# Patient Record
Sex: MALE | Race: WHITE | Employment: FULL TIME | URBAN - METROPOLITAN AREA
[De-identification: names, ages, dates, MRNs, and addresses within clinical notes are randomized per-mention and may not be internally consistent; named-entity substitution may affect disease eponyms.]

---

## 2020-01-10 ENCOUNTER — APPOINTMENT (OUTPATIENT)
Dept: GENERAL RADIOLOGY | Age: 40
End: 2020-01-10
Payer: MEDICAID

## 2020-01-10 ENCOUNTER — APPOINTMENT (OUTPATIENT)
Dept: ULTRASOUND IMAGING | Age: 40
End: 2020-01-10
Payer: MEDICAID

## 2020-01-10 ENCOUNTER — HOSPITAL ENCOUNTER (INPATIENT)
Age: 40
LOS: 5 days | Discharge: HOME | End: 2020-01-15
Attending: EMERGENCY MEDICINE | Admitting: INTERNAL MEDICINE
Payer: MEDICAID

## 2020-01-10 PROBLEM — R74.01 TRANSAMINITIS: Status: ACTIVE | Noted: 2020-01-10

## 2020-01-10 LAB
ABSOLUTE EOS #: 0.03 K/UL (ref 0–0.44)
ABSOLUTE IMMATURE GRANULOCYTE: <0.03 K/UL (ref 0–0.3)
ABSOLUTE LYMPH #: 1.2 K/UL (ref 1.1–3.7)
ABSOLUTE MONO #: 0.44 K/UL (ref 0.1–1.2)
ACETAMINOPHEN LEVEL: <5 UG/ML (ref 10–30)
ALBUMIN SERPL-MCNC: 4.7 G/DL (ref 3.5–5.2)
ALBUMIN/GLOBULIN RATIO: 1.3 (ref 1–2.5)
ALP BLD-CCNC: 82 U/L (ref 40–129)
ALT SERPL-CCNC: 989 U/L (ref 5–41)
ANION GAP SERPL CALCULATED.3IONS-SCNC: 18 MMOL/L (ref 9–17)
AST SERPL-CCNC: 650 U/L
BASOPHILS # BLD: 1 % (ref 0–2)
BASOPHILS ABSOLUTE: 0.04 K/UL (ref 0–0.2)
BILIRUB SERPL-MCNC: 0.2 MG/DL (ref 0.3–1.2)
BUN BLDV-MCNC: 16 MG/DL (ref 6–20)
BUN/CREAT BLD: ABNORMAL (ref 9–20)
CALCIUM SERPL-MCNC: 9.2 MG/DL (ref 8.6–10.4)
CHLORIDE BLD-SCNC: 95 MMOL/L (ref 98–107)
CO2: 22 MMOL/L (ref 20–31)
CREAT SERPL-MCNC: 0.88 MG/DL (ref 0.7–1.2)
DIFFERENTIAL TYPE: ABNORMAL
EOSINOPHILS RELATIVE PERCENT: 1 % (ref 1–4)
ETHANOL PERCENT: 0.16 %
ETHANOL: 161 MG/DL
GFR AFRICAN AMERICAN: >60 ML/MIN
GFR NON-AFRICAN AMERICAN: >60 ML/MIN
GFR SERPL CREATININE-BSD FRML MDRD: ABNORMAL ML/MIN/{1.73_M2}
GFR SERPL CREATININE-BSD FRML MDRD: ABNORMAL ML/MIN/{1.73_M2}
GLUCOSE BLD-MCNC: 144 MG/DL (ref 70–99)
HAV IGM SER IA-ACNC: NONREACTIVE
HCT VFR BLD CALC: 46.2 % (ref 40.7–50.3)
HEMOGLOBIN: 15.3 G/DL (ref 13–17)
HEPATITIS B CORE IGM ANTIBODY: NONREACTIVE
HEPATITIS B SURFACE ANTIGEN: NONREACTIVE
HEPATITIS C ANTIBODY: NONREACTIVE
IMMATURE GRANULOCYTES: 1 %
INR BLD: 0.9
LIPASE: 28 U/L (ref 13–60)
LYMPHOCYTES # BLD: 28 % (ref 24–43)
MCH RBC QN AUTO: 31.3 PG (ref 25.2–33.5)
MCHC RBC AUTO-ENTMCNC: 33.1 G/DL (ref 28.4–34.8)
MCV RBC AUTO: 94.5 FL (ref 82.6–102.9)
MONOCYTES # BLD: 10 % (ref 3–12)
NRBC AUTOMATED: 0 PER 100 WBC
PDW BLD-RTO: 13.3 % (ref 11.8–14.4)
PLATELET # BLD: 266 K/UL (ref 138–453)
PLATELET ESTIMATE: ABNORMAL
PMV BLD AUTO: 8.8 FL (ref 8.1–13.5)
POTASSIUM SERPL-SCNC: 4.1 MMOL/L (ref 3.7–5.3)
PROTHROMBIN TIME: 9.8 SEC (ref 9–12)
RBC # BLD: 4.89 M/UL (ref 4.21–5.77)
RBC # BLD: ABNORMAL 10*6/UL
SALICYLATE LEVEL: <1 MG/DL (ref 3–10)
SEG NEUTROPHILS: 59 % (ref 36–65)
SEGMENTED NEUTROPHILS ABSOLUTE COUNT: 2.62 K/UL (ref 1.5–8.1)
SODIUM BLD-SCNC: 135 MMOL/L (ref 135–144)
TOTAL PROTEIN: 8.3 G/DL (ref 6.4–8.3)
TOXIC TRICYCLIC SC,BLOOD: NEGATIVE
WBC # BLD: 4.4 K/UL (ref 3.5–11.3)
WBC # BLD: ABNORMAL 10*3/UL

## 2020-01-10 PROCEDURE — 1200000000 HC SEMI PRIVATE

## 2020-01-10 PROCEDURE — 99285 EMERGENCY DEPT VISIT HI MDM: CPT

## 2020-01-10 PROCEDURE — 76705 ECHO EXAM OF ABDOMEN: CPT

## 2020-01-10 PROCEDURE — 83690 ASSAY OF LIPASE: CPT

## 2020-01-10 PROCEDURE — 80307 DRUG TEST PRSMV CHEM ANLYZR: CPT

## 2020-01-10 PROCEDURE — 85025 COMPLETE CBC W/AUTO DIFF WBC: CPT

## 2020-01-10 PROCEDURE — 6370000000 HC RX 637 (ALT 250 FOR IP): Performed by: GENERAL PRACTICE

## 2020-01-10 PROCEDURE — 80053 COMPREHEN METABOLIC PANEL: CPT

## 2020-01-10 PROCEDURE — 80074 ACUTE HEPATITIS PANEL: CPT

## 2020-01-10 PROCEDURE — 85610 PROTHROMBIN TIME: CPT

## 2020-01-10 PROCEDURE — G0480 DRUG TEST DEF 1-7 CLASSES: HCPCS

## 2020-01-10 PROCEDURE — C9113 INJ PANTOPRAZOLE SODIUM, VIA: HCPCS | Performed by: STUDENT IN AN ORGANIZED HEALTH CARE EDUCATION/TRAINING PROGRAM

## 2020-01-10 PROCEDURE — 2580000003 HC RX 258: Performed by: STUDENT IN AN ORGANIZED HEALTH CARE EDUCATION/TRAINING PROGRAM

## 2020-01-10 PROCEDURE — 6360000002 HC RX W HCPCS: Performed by: STUDENT IN AN ORGANIZED HEALTH CARE EDUCATION/TRAINING PROGRAM

## 2020-01-10 PROCEDURE — 2580000003 HC RX 258: Performed by: GENERAL PRACTICE

## 2020-01-10 PROCEDURE — 71046 X-RAY EXAM CHEST 2 VIEWS: CPT

## 2020-01-10 RX ORDER — 0.9 % SODIUM CHLORIDE 0.9 %
500 INTRAVENOUS SOLUTION INTRAVENOUS ONCE
Status: COMPLETED | OUTPATIENT
Start: 2020-01-10 | End: 2020-01-10

## 2020-01-10 RX ORDER — ONDANSETRON 2 MG/ML
4 INJECTION INTRAMUSCULAR; INTRAVENOUS EVERY 6 HOURS PRN
Status: DISCONTINUED | OUTPATIENT
Start: 2020-01-10 | End: 2020-01-15 | Stop reason: HOSPADM

## 2020-01-10 RX ORDER — PANTOPRAZOLE SODIUM 40 MG/1
40 TABLET, DELAYED RELEASE ORAL
Status: DISCONTINUED | OUTPATIENT
Start: 2020-01-11 | End: 2020-01-10

## 2020-01-10 RX ORDER — THIAMINE MONONITRATE (VIT B1) 100 MG
100 TABLET ORAL DAILY
Status: DISCONTINUED | OUTPATIENT
Start: 2020-01-11 | End: 2020-01-15 | Stop reason: HOSPADM

## 2020-01-10 RX ORDER — SODIUM CHLORIDE 0.9 % (FLUSH) 0.9 %
10 SYRINGE (ML) INJECTION PRN
Status: DISCONTINUED | OUTPATIENT
Start: 2020-01-10 | End: 2020-01-15 | Stop reason: HOSPADM

## 2020-01-10 RX ORDER — SODIUM CHLORIDE 9 MG/ML
10 INJECTION INTRAVENOUS 2 TIMES DAILY
Status: DISCONTINUED | OUTPATIENT
Start: 2020-01-10 | End: 2020-01-12

## 2020-01-10 RX ORDER — PANTOPRAZOLE SODIUM 40 MG/10ML
40 INJECTION, POWDER, LYOPHILIZED, FOR SOLUTION INTRAVENOUS 2 TIMES DAILY
Status: DISCONTINUED | OUTPATIENT
Start: 2020-01-10 | End: 2020-01-12

## 2020-01-10 RX ORDER — PANTOPRAZOLE SODIUM 40 MG/1
40 TABLET, DELAYED RELEASE ORAL ONCE
Status: COMPLETED | OUTPATIENT
Start: 2020-01-10 | End: 2020-01-10

## 2020-01-10 RX ORDER — SODIUM CHLORIDE 0.9 % (FLUSH) 0.9 %
10 SYRINGE (ML) INJECTION EVERY 12 HOURS SCHEDULED
Status: DISCONTINUED | OUTPATIENT
Start: 2020-01-10 | End: 2020-01-15 | Stop reason: HOSPADM

## 2020-01-10 RX ORDER — M-VIT,TX,IRON,MINS/CALC/FOLIC 27MG-0.4MG
1 TABLET ORAL DAILY
Status: DISCONTINUED | OUTPATIENT
Start: 2020-01-11 | End: 2020-01-12

## 2020-01-10 RX ORDER — ONDANSETRON 4 MG/1
4 TABLET, ORALLY DISINTEGRATING ORAL ONCE
Status: COMPLETED | OUTPATIENT
Start: 2020-01-10 | End: 2020-01-10

## 2020-01-10 RX ORDER — IBUPROFEN 400 MG/1
400 TABLET ORAL ONCE
Status: COMPLETED | OUTPATIENT
Start: 2020-01-10 | End: 2020-01-10

## 2020-01-10 RX ADMIN — IBUPROFEN 400 MG: 400 TABLET, FILM COATED ORAL at 17:46

## 2020-01-10 RX ADMIN — PANTOPRAZOLE SODIUM 40 MG: 40 INJECTION, POWDER, FOR SOLUTION INTRAVENOUS at 21:38

## 2020-01-10 RX ADMIN — Medication 10 ML: at 21:38

## 2020-01-10 RX ADMIN — PANTOPRAZOLE SODIUM 40 MG: 40 TABLET, DELAYED RELEASE ORAL at 13:37

## 2020-01-10 RX ADMIN — ONDANSETRON 4 MG: 4 TABLET, ORALLY DISINTEGRATING ORAL at 13:37

## 2020-01-10 RX ADMIN — SODIUM CHLORIDE 500 ML: 9 INJECTION, SOLUTION INTRAVENOUS at 17:58

## 2020-01-10 ASSESSMENT — PAIN SCALES - GENERAL
PAINLEVEL_OUTOF10: 0
PAINLEVEL_OUTOF10: 8
PAINLEVEL_OUTOF10: 7
PAINLEVEL_OUTOF10: 5

## 2020-01-10 ASSESSMENT — ENCOUNTER SYMPTOMS
ABDOMINAL PAIN: 0
NAUSEA: 1
SINUS PRESSURE: 0
BLOOD IN STOOL: 0
COUGH: 1
SINUS PAIN: 0
VOMITING: 1

## 2020-01-10 ASSESSMENT — PAIN DESCRIPTION - DESCRIPTORS: DESCRIPTORS: THROBBING

## 2020-01-10 ASSESSMENT — PAIN DESCRIPTION - PAIN TYPE: TYPE: ACUTE PAIN

## 2020-01-10 ASSESSMENT — PAIN DESCRIPTION - PROGRESSION
CLINICAL_PROGRESSION: NOT CHANGED
CLINICAL_PROGRESSION: GRADUALLY IMPROVING

## 2020-01-10 ASSESSMENT — PAIN DESCRIPTION - LOCATION
LOCATION: HEAD
LOCATION: HEAD

## 2020-01-10 ASSESSMENT — PAIN DESCRIPTION - FREQUENCY: FREQUENCY: CONTINUOUS

## 2020-01-10 NOTE — ED NOTES
Pt resting on bed, c/o pain in the middle of the RUQ and LUQ, febrile, no acute distress     Lev Watson RN  01/10/20 9211

## 2020-01-10 NOTE — ED PROVIDER NOTES
[facility]  ED  Emergency Department Encounter  EmergencyMedicine Resident     Pt Name:Rolly Dudley  MRN: [id number]  [place] 1980  Date of evaluation: 1/10/20  PCP:  No primary care provider on file. CHIEF COMPLAINT       Chief Complaint   Patient presents with    Hemoptysis       HISTORY OF PRESENT ILLNESS  (Location/Symptom, Timing/Onset, Context/Setting, Quality, Duration, Modifying Factors, Severity.)      Jacobo Preciado is a [address] [facility] y.o. male who presents with hemoptysis. Patient states he has had fever, chills, body aches, nausea, vomiting and pain in his chest with coughing and breathing. Patient states he is a chronic alcoholic, takes naltrexone, but states he is still drinking, approximately a gallon of 80 proof vodka a day, drink yesterday, denies any vomiting with drinking. Patient states he has been coughing up bright red blood with pleuritic chest pain. Patient denies any abdominal pain. denies skin rash, any recent travel out of the country. PAST MEDICAL / SURGICAL / SOCIAL / FAMILY HISTORY      has no past medical history on file. has no past surgical history on file.     Social History     Socioeconomic History    Marital status:      Spouse name: Not on file    Number of children: Not on file    Years of education: Not on file    Highest education level: Not on file   Occupational History    Not on file   Social Needs    Financial resource strain: Not on file    Food insecurity:     Worry: Not on file     Inability: Not on file    Transportation needs:     Medical: Not on file     Non-medical: Not on file   Tobacco Use    Smoking status: Not on file   Substance and Sexual Activity    Alcohol use: Not on file    Drug use: Not on file    Sexual activity: Not on file   Lifestyle    Physical activity:     Days per week: Not on file     Minutes per session: Not on file    Stress: Not on file   Relationships    Social connections:     Talks external ear normal.      Left Ear: Tympanic membrane, ear canal and external ear normal.      Nose: Nose normal.      Mouth/Throat:      Mouth: Mucous membranes are moist.      Pharynx: No oropharyngeal exudate or posterior oropharyngeal erythema. Eyes:      General:         Right eye: No discharge. Left eye: No discharge. Extraocular Movements: Extraocular movements intact. Pupils: Pupils are equal, round, and reactive to light. Cardiovascular:      Rate and Rhythm: Normal rate. Pulses: Normal pulses. Heart sounds: Normal heart sounds. Pulmonary:      Effort: Pulmonary effort is normal.      Breath sounds: Normal breath sounds. Abdominal:      General: Abdomen is flat. Tenderness: There is no tenderness. There is no guarding. Musculoskeletal: Normal range of motion. Skin:     General: Skin is warm. Neurological:      General: No focal deficit present. Mental Status: He is alert and oriented to person, place, and time. Motor: No weakness. Psychiatric:         Mood and Affect: Mood normal.         Behavior: Behavior normal.         Thought Content:  Thought content normal.         Judgment: Judgment normal.         DIFFERENTIAL  DIAGNOSIS     PLAN (LABS / IMAGING / EKG):  Orders Placed This Encounter   Procedures    XR CHEST STANDARD (2 VW)    CBC Auto Differential    Comprehensive Metabolic Panel    LIPASE    Hepatitis Panel, Acute    Protime-INR    TOX SCR, BLD, ED    Inpatient consult to GI    Inpatient consult to Internal Medicine    PATIENT STATUS (FROM ED OR OR/PROCEDURAL) Inpatient       MEDICATIONS ORDERED:  Orders Placed This Encounter   Medications    ondansetron (ZOFRAN-ODT) disintegrating tablet 4 mg    DISCONTD: pantoprazole (PROTONIX) tablet 40 mg    pantoprazole (PROTONIX) tablet 40 mg       DDX: Pneumonia, aspiration pneumonitis, lung abscess, gastric varices, Cristal-Mitchell, less likely Boerhaave's or tuberculosis    DIAGNOSTIC RESULTS / EMERGENCY DEPARTMENT COURSE / MDM   :  Results for orders placed or performed during the hospital encounter of 01/10/20   CBC Auto Differential   Result Value Ref Range    WBC 4.4 3.5 - 11.3 k/uL    RBC 4.89 4.21 - 5.77 m/uL    Hemoglobin 15.3 13.0 - 17.0 g/dL    Hematocrit 46.2 40.7 - 50.3 %    MCV 94.5 82.6 - 102.9 fL    MCH 31.3 25.2 - 33.5 pg    MCHC 33.1 28.4 - 34.8 g/dL    RDW 13.3 11.8 - 14.4 %    Platelets 883 596 - 152 k/uL    MPV 8.8 8.1 - 13.5 fL    NRBC Automated 0.0 0.0 per 100 WBC    Differential Type NOT REPORTED     Seg Neutrophils 59 36 - 65 %    Lymphocytes 28 24 - 43 %    Monocytes 10 3 - 12 %    Eosinophils % 1 1 - 4 %    Basophils 1 0 - 2 %    Immature Granulocytes 1 (H) 0 %    Segs Absolute 2.62 1.50 - 8.10 k/uL    Absolute Lymph # 1.20 1.10 - 3.70 k/uL    Absolute Mono # 0.44 0.10 - 1.20 k/uL    Absolute Eos # 0.03 0.00 - 0.44 k/uL    Basophils Absolute 0.04 0.00 - 0.20 k/uL    Absolute Immature Granulocyte <0.03 0.00 - 0.30 k/uL    WBC Morphology NOT REPORTED     RBC Morphology NOT REPORTED     Platelet Estimate NOT REPORTED    Comprehensive Metabolic Panel   Result Value Ref Range    Glucose 144 (H) 70 - 99 mg/dL    BUN 16 6 - 20 mg/dL    CREATININE 0.88 0.70 - 1.20 mg/dL    Bun/Cre Ratio NOT REPORTED 9 - 20    Calcium 9.2 8.6 - 10.4 mg/dL    Sodium 135 135 - 144 mmol/L    Potassium 4.1 3.7 - 5.3 mmol/L    Chloride 95 (L) 98 - 107 mmol/L    CO2 22 20 - 31 mmol/L    Anion Gap 18 (H) 9 - 17 mmol/L    Alkaline Phosphatase 82 40 - 129 U/L     (H) 5 - 41 U/L     (H) <40 U/L    Total Bilirubin 0.20 (L) 0.3 - 1.2 mg/dL    Total Protein 8.3 6.4 - 8.3 g/dL    Alb 4.7 3.5 - 5.2 g/dL    Albumin/Globulin Ratio 1.3 1.0 - 2.5    GFR Non-African American >60 >60 mL/min    GFR African American >60 >60 mL/min    GFR Comment          GFR Staging NOT REPORTED    LIPASE   Result Value Ref Range    Lipase 28 13 - 60 U/L   Protime-INR   Result Value Ref Range    Protime 9.8 9.0 - 12.0 sec

## 2020-01-10 NOTE — ED NOTES
Patient instructed that he is unable to eat at this time.  Ultrasound done per ER resident      Mariam Pressley, RN  01/10/20 0512

## 2020-01-10 NOTE — ED NOTES
Pt ambulatory to ER c/o fever, hemoptysis, nausea, emesis, fatigue x 3 days.  Pt not experiencing any chest pain, resting on bed, no acute distress, alert and oriented x4          Minus SHELLEY Lai  01/10/20 0979

## 2020-01-11 PROBLEM — K70.10 ALCOHOLIC HEPATITIS WITHOUT ASCITES: Status: ACTIVE | Noted: 2020-01-11

## 2020-01-11 PROBLEM — R04.2 COUGH WITH HEMOPTYSIS: Status: ACTIVE | Noted: 2020-01-11

## 2020-01-11 LAB
ABSOLUTE EOS #: 0.04 K/UL (ref 0–0.44)
ABSOLUTE IMMATURE GRANULOCYTE: <0.03 K/UL (ref 0–0.3)
ABSOLUTE LYMPH #: 0.96 K/UL (ref 1.1–3.7)
ABSOLUTE MONO #: 0.44 K/UL (ref 0.1–1.2)
ADENOVIRUS PCR: NOT DETECTED
ALBUMIN SERPL-MCNC: 4.2 G/DL (ref 3.5–5.2)
ALBUMIN/GLOBULIN RATIO: 1.4 (ref 1–2.5)
ALP BLD-CCNC: 71 U/L (ref 40–129)
ALT SERPL-CCNC: 746 U/L (ref 5–41)
ANION GAP SERPL CALCULATED.3IONS-SCNC: 19 MMOL/L (ref 9–17)
AST SERPL-CCNC: 431 U/L
BASOPHILS # BLD: 1 % (ref 0–2)
BASOPHILS ABSOLUTE: 0.04 K/UL (ref 0–0.2)
BILIRUB SERPL-MCNC: 0.32 MG/DL (ref 0.3–1.2)
BILIRUBIN DIRECT: 0.09 MG/DL
BILIRUBIN, INDIRECT: 0.23 MG/DL (ref 0–1)
BORDETELLA PARAPERTUSSIS: NOT DETECTED
BORDETELLA PERTUSSIS PCR: NOT DETECTED
BUN BLDV-MCNC: 16 MG/DL (ref 6–20)
BUN/CREAT BLD: ABNORMAL (ref 9–20)
CALCIUM SERPL-MCNC: 8.9 MG/DL (ref 8.6–10.4)
CHLAMYDIA PNEUMONIAE BY PCR: NOT DETECTED
CHLORIDE BLD-SCNC: 92 MMOL/L (ref 98–107)
CO2: 20 MMOL/L (ref 20–31)
CORONAVIRUS 229E PCR: NOT DETECTED
CORONAVIRUS HKU1 PCR: NOT DETECTED
CORONAVIRUS NL63 PCR: NOT DETECTED
CORONAVIRUS OC43 PCR: NOT DETECTED
CREAT SERPL-MCNC: 0.84 MG/DL (ref 0.7–1.2)
DIFFERENTIAL TYPE: ABNORMAL
EOSINOPHILS RELATIVE PERCENT: 1 % (ref 1–4)
FERRITIN: 2450 UG/L (ref 30–400)
FOLATE: 15 NG/ML
GFR AFRICAN AMERICAN: >60 ML/MIN
GFR NON-AFRICAN AMERICAN: >60 ML/MIN
GFR SERPL CREATININE-BSD FRML MDRD: ABNORMAL ML/MIN/{1.73_M2}
GFR SERPL CREATININE-BSD FRML MDRD: ABNORMAL ML/MIN/{1.73_M2}
GLOBULIN: ABNORMAL G/DL (ref 1.5–3.8)
GLUCOSE BLD-MCNC: 95 MG/DL (ref 70–99)
HCT VFR BLD CALC: 43.9 % (ref 40.7–50.3)
HEMOGLOBIN: 14.4 G/DL (ref 13–17)
HUMAN METAPNEUMOVIRUS PCR: NOT DETECTED
IMMATURE GRANULOCYTES: 0 %
INFLUENZA A BY PCR: NOT DETECTED
INFLUENZA A H1 (2009) PCR: ABNORMAL
INFLUENZA A H1 PCR: ABNORMAL
INFLUENZA A H3 PCR: ABNORMAL
INFLUENZA B BY PCR: DETECTED
LYMPHOCYTES # BLD: 31 % (ref 24–43)
MCH RBC QN AUTO: 31.4 PG (ref 25.2–33.5)
MCHC RBC AUTO-ENTMCNC: 32.8 G/DL (ref 28.4–34.8)
MCV RBC AUTO: 95.6 FL (ref 82.6–102.9)
MONOCYTES # BLD: 14 % (ref 3–12)
MYCOPLASMA PNEUMONIAE PCR: NOT DETECTED
NRBC AUTOMATED: 0 PER 100 WBC
PARAINFLUENZA 1 PCR: NOT DETECTED
PARAINFLUENZA 2 PCR: NOT DETECTED
PARAINFLUENZA 3 PCR: NOT DETECTED
PARAINFLUENZA 4 PCR: NOT DETECTED
PDW BLD-RTO: 13 % (ref 11.8–14.4)
PLATELET # BLD: 217 K/UL (ref 138–453)
PLATELET ESTIMATE: ABNORMAL
PMV BLD AUTO: 8.8 FL (ref 8.1–13.5)
POTASSIUM SERPL-SCNC: 4.2 MMOL/L (ref 3.7–5.3)
RBC # BLD: 4.59 M/UL (ref 4.21–5.77)
RBC # BLD: ABNORMAL 10*6/UL
RESP SYNCYTIAL VIRUS PCR: NOT DETECTED
RHINO/ENTEROVIRUS PCR: NOT DETECTED
SEG NEUTROPHILS: 53 % (ref 36–65)
SEGMENTED NEUTROPHILS ABSOLUTE COUNT: 1.62 K/UL (ref 1.5–8.1)
SODIUM BLD-SCNC: 131 MMOL/L (ref 135–144)
SPECIMEN DESCRIPTION: ABNORMAL
TOTAL PROTEIN: 7.3 G/DL (ref 6.4–8.3)
VITAMIN B-12: 1179 PG/ML (ref 232–1245)
WBC # BLD: 3.1 K/UL (ref 3.5–11.3)
WBC # BLD: ABNORMAL 10*3/UL

## 2020-01-11 PROCEDURE — 86694 HERPES SIMPLEX NES ANTBDY: CPT

## 2020-01-11 PROCEDURE — 2580000003 HC RX 258: Performed by: STUDENT IN AN ORGANIZED HEALTH CARE EDUCATION/TRAINING PROGRAM

## 2020-01-11 PROCEDURE — 1200000000 HC SEMI PRIVATE

## 2020-01-11 PROCEDURE — 86645 CMV ANTIBODY IGM: CPT

## 2020-01-11 PROCEDURE — 86255 FLUORESCENT ANTIBODY SCREEN: CPT

## 2020-01-11 PROCEDURE — 80048 BASIC METABOLIC PNL TOTAL CA: CPT

## 2020-01-11 PROCEDURE — 99222 1ST HOSP IP/OBS MODERATE 55: CPT | Performed by: INTERNAL MEDICINE

## 2020-01-11 PROCEDURE — 82746 ASSAY OF FOLIC ACID SERUM: CPT

## 2020-01-11 PROCEDURE — 85025 COMPLETE CBC W/AUTO DIFF WBC: CPT

## 2020-01-11 PROCEDURE — 82728 ASSAY OF FERRITIN: CPT

## 2020-01-11 PROCEDURE — 86664 EPSTEIN-BARR NUCLEAR ANTIGEN: CPT

## 2020-01-11 PROCEDURE — 36415 COLL VENOUS BLD VENIPUNCTURE: CPT

## 2020-01-11 PROCEDURE — 86235 NUCLEAR ANTIGEN ANTIBODY: CPT

## 2020-01-11 PROCEDURE — 6370000000 HC RX 637 (ALT 250 FOR IP): Performed by: STUDENT IN AN ORGANIZED HEALTH CARE EDUCATION/TRAINING PROGRAM

## 2020-01-11 PROCEDURE — 86225 DNA ANTIBODY NATIVE: CPT

## 2020-01-11 PROCEDURE — 82607 VITAMIN B-12: CPT

## 2020-01-11 PROCEDURE — 6360000002 HC RX W HCPCS: Performed by: STUDENT IN AN ORGANIZED HEALTH CARE EDUCATION/TRAINING PROGRAM

## 2020-01-11 PROCEDURE — 0100U HC RESPIRPTHGN MULT REV TRANS & AMP PRB TECH 21 TRGT: CPT

## 2020-01-11 PROCEDURE — 80076 HEPATIC FUNCTION PANEL: CPT

## 2020-01-11 PROCEDURE — C9113 INJ PANTOPRAZOLE SODIUM, VIA: HCPCS | Performed by: STUDENT IN AN ORGANIZED HEALTH CARE EDUCATION/TRAINING PROGRAM

## 2020-01-11 PROCEDURE — 86665 EPSTEIN-BARR CAPSID VCA: CPT

## 2020-01-11 PROCEDURE — 86663 EPSTEIN-BARR ANTIBODY: CPT

## 2020-01-11 PROCEDURE — 86038 ANTINUCLEAR ANTIBODIES: CPT

## 2020-01-11 RX ORDER — LORAZEPAM 2 MG/ML
3 INJECTION INTRAMUSCULAR
Status: DISCONTINUED | OUTPATIENT
Start: 2020-01-11 | End: 2020-01-15 | Stop reason: HOSPADM

## 2020-01-11 RX ORDER — FOLIC ACID 1 MG/1
1 TABLET ORAL DAILY
Status: DISCONTINUED | OUTPATIENT
Start: 2020-01-11 | End: 2020-01-15 | Stop reason: HOSPADM

## 2020-01-11 RX ORDER — TRAMADOL HYDROCHLORIDE 50 MG/1
50 TABLET ORAL EVERY 8 HOURS PRN
Status: DISCONTINUED | OUTPATIENT
Start: 2020-01-11 | End: 2020-01-15 | Stop reason: HOSPADM

## 2020-01-11 RX ORDER — LORAZEPAM 1 MG/1
1 TABLET ORAL
Status: DISCONTINUED | OUTPATIENT
Start: 2020-01-11 | End: 2020-01-15 | Stop reason: HOSPADM

## 2020-01-11 RX ORDER — LORAZEPAM 2 MG/1
2 TABLET ORAL
Status: DISCONTINUED | OUTPATIENT
Start: 2020-01-11 | End: 2020-01-15 | Stop reason: HOSPADM

## 2020-01-11 RX ORDER — OSELTAMIVIR PHOSPHATE 75 MG/1
75 CAPSULE ORAL 2 TIMES DAILY
Status: DISCONTINUED | OUTPATIENT
Start: 2020-01-11 | End: 2020-01-15 | Stop reason: HOSPADM

## 2020-01-11 RX ORDER — LORAZEPAM 0.5 MG/1
0.5 TABLET ORAL ONCE
Status: COMPLETED | OUTPATIENT
Start: 2020-01-11 | End: 2020-01-11

## 2020-01-11 RX ORDER — LORAZEPAM 2 MG/1
4 TABLET ORAL
Status: DISCONTINUED | OUTPATIENT
Start: 2020-01-11 | End: 2020-01-15 | Stop reason: HOSPADM

## 2020-01-11 RX ORDER — LORAZEPAM 2 MG/ML
2 INJECTION INTRAMUSCULAR
Status: DISCONTINUED | OUTPATIENT
Start: 2020-01-11 | End: 2020-01-15 | Stop reason: HOSPADM

## 2020-01-11 RX ORDER — LORAZEPAM 2 MG/ML
1 INJECTION INTRAMUSCULAR
Status: DISCONTINUED | OUTPATIENT
Start: 2020-01-11 | End: 2020-01-15 | Stop reason: HOSPADM

## 2020-01-11 RX ORDER — LORAZEPAM 2 MG/ML
4 INJECTION INTRAMUSCULAR
Status: DISCONTINUED | OUTPATIENT
Start: 2020-01-11 | End: 2020-01-15 | Stop reason: HOSPADM

## 2020-01-11 RX ADMIN — OSELTAMIVIR PHOSPHATE 75 MG: 75 CAPSULE ORAL at 20:12

## 2020-01-11 RX ADMIN — Medication 10 ML: at 09:37

## 2020-01-11 RX ADMIN — PANTOPRAZOLE SODIUM 40 MG: 40 INJECTION, POWDER, FOR SOLUTION INTRAVENOUS at 09:37

## 2020-01-11 RX ADMIN — Medication 10 ML: at 20:13

## 2020-01-11 RX ADMIN — OSELTAMIVIR PHOSPHATE 75 MG: 75 CAPSULE ORAL at 09:37

## 2020-01-11 RX ADMIN — TRAMADOL HYDROCHLORIDE 50 MG: 50 TABLET, FILM COATED ORAL at 14:31

## 2020-01-11 RX ADMIN — LORAZEPAM 0.5 MG: 0.5 TABLET ORAL at 03:49

## 2020-01-11 RX ADMIN — ENOXAPARIN SODIUM 40 MG: 40 INJECTION SUBCUTANEOUS at 09:37

## 2020-01-11 RX ADMIN — MULTIPLE VITAMINS W/ MINERALS TAB 1 TABLET: TAB at 14:27

## 2020-01-11 RX ADMIN — PANTOPRAZOLE SODIUM 40 MG: 40 INJECTION, POWDER, FOR SOLUTION INTRAVENOUS at 20:12

## 2020-01-11 RX ADMIN — Medication 100 MG: at 09:37

## 2020-01-11 RX ADMIN — FOLIC ACID 1 MG: 1 TABLET ORAL at 09:37

## 2020-01-11 ASSESSMENT — ENCOUNTER SYMPTOMS
WHEEZING: 0
NAUSEA: 1
ABDOMINAL PAIN: 0
COUGH: 1
SHORTNESS OF BREATH: 0
EYE DISCHARGE: 0
BACK PAIN: 0
EYE ITCHING: 0
FACIAL SWELLING: 0
COLOR CHANGE: 0
ABDOMINAL DISTENTION: 0
VOMITING: 0
BLOOD IN STOOL: 0
CHEST TIGHTNESS: 0
CONSTIPATION: 0
APNEA: 0
EYE REDNESS: 0

## 2020-01-11 ASSESSMENT — PAIN SCALES - GENERAL: PAINLEVEL_OUTOF10: 6

## 2020-01-11 NOTE — PROGRESS NOTES
Alcohol hepatitis with strong alcohol intake history. Elevated liver enzymes but ALT > AST, trend LFTs. PT- INR, low fat diet. hepatitis panel. Get USG liver, autoimmune hepatitis work up. Check ferritin if high ( unlikely) will consider working up for hemochromatosis. No tylenol intake. Check other viral illness EBV, CMV, HSV. Will check ED tox. Highly unlikley nata disease no other systemic involvement, no Avila - Fleischer rings. Hemoptysis. Had clear discussion with the pt whether this is hemoptysis or spitting up of blood from GI origin. Pt stating he coughs up sputum with most of the time streaks of blood b. No clear etiology identified at this time. Could be simple bronchitis secondary to smoking or viral bronchitis. Will get resp viral PCR panel. CXR looks clean no Pneumonia. Will resume DVT prophylaxis.

## 2020-01-11 NOTE — H&P
[address]     Department of Internal Medicine - Staff Internal Medicine Teaching Service          ADMISSION NOTE/HISTORY AND PHYSICAL EXAMINATION   Date: 1/11/2020  Patient Name: Lamin Noriega  Date of admission: 1/10/2020  1:21 PM  YOB: 1980  PCP: No primary care provider on file. History Obtained From:  patient    CHIEF COMPLAINT     Chief complaint: Hemoptysis    HISTORY OF PRESENTING ILLNESS     The patient is a pleasant 44 y.o. male presents with a chief complaint of hemoptysis. Patient states that sometimes when he coughs he brings up yellowish sputum stained with blood. He says that for the past couple of days he has been cough with sputum production. He also complains of having fever, chills, body aches, nausea and vomiting with mostly food particles but no blood. He says that there are people sick around him. He denies having any diarrhea or constipation. He denies any palpitations or shortness of breath. He says that he has been drinking alcohol everyday for a long time. He went to [facility] rehab last year but then checked himself out. He is still taking Naltrexone but drinks around a gallon of 80% proof vodka everyday. He says that he hasn't had episodes of vomiting after drinking and says that he has never aspirated. He denied taking any over the counter supplements, said he took just one tylenol in the past couple of days and also denies taking any workout supplements. Patient does not have any other significant medical history, with one admission to [facility] clinic for a laceration when he fell after drinking. He smokes about half a pack of cigarettes a day for the past 10 yrs. He also says that he smokes marijuana once in a while but denied any other drugs. In the emergency department ALT and AST found to be elevated. Ethanol levels were elevated. Hepatitis panel was added on.   INR was 0.9, bedside ultrasound was performed by ultrasound resident, there was some echogenicity in the pancreas, with a small amount of peripancreatic fluid noted, patient did not have any belly pain, lipase was 28. Discussed with GI, who recommended admission for further work-up. Review of Systems:  Review of Systems   Constitutional: Negative for activity change, appetite change, fatigue and fever. HENT: Negative for congestion, drooling, ear discharge, facial swelling, mouth sores and postnasal drip. Eyes: Negative for discharge, redness and itching. Respiratory: Positive for cough. Negative for apnea, chest tightness, shortness of breath and wheezing. Cardiovascular: Positive for chest pain (mostly with coughing). Gastrointestinal: Positive for nausea. Negative for abdominal distention, abdominal pain, blood in stool, constipation and vomiting. Endocrine: Negative for cold intolerance and heat intolerance. Genitourinary: Negative for difficulty urinating, dysuria, enuresis and frequency. Musculoskeletal: Negative for arthralgias and back pain. Skin: Negative for color change, pallor and wound. Allergic/Immunologic: Negative for environmental allergies and food allergies. Neurological: Negative for dizziness, facial asymmetry, speech difficulty and light-headedness. Hematological: Negative for adenopathy. Psychiatric/Behavioral: Negative for agitation, behavioral problems, confusion and decreased concentration. PAST MEDICAL HISTORY     History reviewed. No pertinent past medical history. PAST SURGICAL HISTORY     No past surgical history on file. ALLERGIES     Patient has no known allergies.     MEDICATIONS PRIOR TO ADMISSION     Prior to Admission medications    Not on File       SOCIAL HISTORY     Social History     Socioeconomic History    Marital status:      Spouse name: Not on file    Number of children: Not on file    Years of education: Not on file    Highest education level: Not on file Occupational History    Not on file   Social Needs    Financial resource strain: Not on file    Food insecurity:     Worry: Not on file     Inability: Not on file    Transportation needs:     Medical: Not on file     Non-medical: Not on file   Tobacco Use    Smoking status: Not on file   Substance and Sexual Activity    Alcohol use: Not on file    Drug use: Not on file    Sexual activity: Not on file   Lifestyle    Physical activity:     Days per week: Not on file     Minutes per session: Not on file    Stress: Not on file   Relationships    Social connections:     Talks on phone: Not on file     Gets together: Not on file     Attends Zoroastrian service: Not on file     Active member of club or organization: Not on file     Attends meetings of clubs or organizations: Not on file     Relationship status: Not on file    Intimate partner violence:     Fear of current or ex partner: Not on file     Emotionally abused: Not on file     Physically abused: Not on file     Forced sexual activity: Not on file   Other Topics Concern    Not on file   Social History Narrative    Not on file         FAMILY HISTORY     No family history on file. PHYSICAL EXAM     Vitals: BP (!) 131/95   Pulse 69   Temp 98.4 °F (36.9 °C) (Oral)   Resp 19   Ht 5' 5\" (1.651 m)   Wt 161 lb (73 kg)   SpO2 96%   BMI 26.79 kg/m²   Tmax: Temp (24hrs), Av.5 °F (37.5 °C), Min:98.4 °F (36.9 °C), Max:102.2 °F (39 °C)    Last Body weight:   Wt Readings from Last 3 Encounters:   01/10/20 161 lb (73 kg)     Body Mass Index : Body mass index is 26.79 kg/m². PHYSICAL EXAMINATION:  Physical Exam  Constitutional:       Appearance: Normal appearance. HENT:      Head: Normocephalic and atraumatic. Mouth/Throat:      Mouth: Mucous membranes are moist.      Pharynx: Oropharynx is clear. Eyes:      Extraocular Movements: Extraocular movements intact. Pupils: Pupils are equal, round, and reactive to light.    Neck: with alcohol intoxication and found to have transaminitis. Patient admitted to inpatient status for further workup    Active Problems:  · Transaminitis         -Chronic alcoholism         -,          -Viral hepatitis panel negative         -Will follow up with results of autoimmune hepatitis         -Follow up vitamin B12 and folate levels    · Alcohol abuse         -Patient takes Naltrexone but still drinking         -will start CIWA protocol in the AM    · Tobacco abuse         -counseling on cessation         -7mg Nicotine patch      DVT ppx: None  GI ppx: Protonix    PT/OT/SW Onboard  Discharge Planning: as per     Katie Rogers MD  Internal Medicine Resident, PGY-1  [address], [place]  1/11/2020, 1:03 AM

## 2020-01-11 NOTE — PROGRESS NOTES
Physical Therapy  DATE: 2020    NAME: Lamin Noriega  MRN: [id number]   : 1980    Patient not seen this date for Physical Therapy due to:  [] Blood transfusion in progress  [] Hemodialysis  []  Patient Declined  [] Spine Precautions   [] Strict Bedrest  [] Surgery/ Procedure  [] Testing      [x] Other: Pt reports baseline  Strength and balance, denies any safety mobility concerns and requests to defer PT. [x] PT being discontinued at this time. Patient baseline. No further needs. [] PT being discontinued at this time as the patient has been transferred to palliative care. No further needs. Physical therapy to sign off at this time.   Navdeep Rojas, PT

## 2020-01-11 NOTE — PLAN OF CARE
Problem: Pain:  Goal: Pain level will decrease  Description  Pain level will decrease  1/11/2020 1005 by Craig Aj RN  Outcome: Ongoing  1/11/2020 1005 by Craig Aj RN  Outcome: Ongoing  1/11/2020 0253 by Nathaniel Kiran RN  Outcome: Ongoing  Goal: Control of acute pain  Description  Control of acute pain  1/11/2020 1005 by Craig Aj RN  Outcome: Ongoing  1/11/2020 1005 by Craig Aj RN  Outcome: Ongoing  1/11/2020 0253 by Nathaniel Kiran RN  Outcome: Ongoing  Goal: Control of chronic pain  Description  Control of chronic pain  1/11/2020 1005 by Craig Aj RN  Outcome: Ongoing  1/11/2020 1005 by Craig Aj RN  Outcome: Ongoing  1/11/2020 0253 by Nathaniel Kiran RN  Outcome: Ongoing

## 2020-01-11 NOTE — PROGRESS NOTES
[facility]  Internal Medicine Teaching Residency Program  Inpatient Daily Progress Note  ______________________________________________________________________________    Patient: Nabila Bar  YOB: 1980   YQD:[id number]    Acct: [de-identified]     Room: 2018/2018-01  Admit date: 1/10/2020  Today's date: 01/11/20  Number of days in the hospital: 1    SUBJECTIVE   Admitting Diagnosis: <principal problem not specified>  CC: Hemoptysis  Pt examined at bedside. Chart & results reviewed. He says he feels much better than yesterday  He is having generalized pain and myalgias  No other complaints overnight     ROS:  Constitutional:  negative for chills, fevers, sweats  Respiratory:  negative for cough, dyspnea on exertion, hemoptysis, shortness of breath, wheezing  Cardiovascular:  negative for chest pain, chest pressure/discomfort, lower extremity edema, palpitations  Gastrointestinal:  negative for abdominal pain, constipation, diarrhea, nausea, vomiting  Neurological:  negative for dizziness, headache  BRIEF HISTORY     The patient is a pleasant 44 y.o. male presents with a chief complaint of hemoptysis. Patient states that sometimes when he coughs he brings up yellowish sputum stained with blood. He says that for the past couple of days he has been cough with sputum production. He also complains of having fever, chills, body aches, nausea and vomiting with mostly food particles but no blood. He says that there are people sick around him. He denies having any diarrhea or constipation. He denies any palpitations or shortness of breath. He says that he has been drinking alcohol everyday for a long time. He went to [facility] rehab last year but then checked himself out. He is still taking Naltrexone but drinks around a gallon of 80% proof vodka everyday. He says that he hasn't had episodes of vomiting after drinking and says that he has never aspirated.  He denied taking any over the counter supplements, said he took just one tylenol in the past couple of days and also denies taking any workout supplements.     Patient does not have any other significant medical history, with one admission to [facility] clinic for a laceration when he fell after drinking. He smokes about half a pack of cigarettes a day for the past 10 yrs. He also says that he smokes marijuana once in a while but denied any other drugs. OBJECTIVE     Vital Signs:  BP (!) 131/95   Pulse 69   Temp 98.4 °F (36.9 °C) (Oral)   Resp 19   Ht 5' 5\" (1.651 m)   Wt 161 lb (73 kg)   SpO2 96%   BMI 26.79 kg/m²     Temp (24hrs), Av.5 °F (37.5 °C), Min:98.4 °F (36.9 °C), Max:102.2 °F (39 °C)    No intake/output data recorded. Physical Exam:  Constitutional: This is a well developed, well nourished, 25-29.9 - Overweight 44y.o. year old male who is alert, oriented, cooperative and in no apparent distress. Head:normocephalic and atraumatic. EENT:  PERRLA. No conjunctival injections. Septum was midline, mucosa was without erythema, exudates or cobblestoning. No thrush was noted. Neck: Supple without thyromegaly. No elevated JVP. Trachea was midline. Respiratory: Chest was symmetrical without dullness to percussion. Breath sounds bilaterally were clear to auscultation. There were no wheezes, rhonchi or rales. There is no intercostal retraction or use of accessory muscles. No egophony noted. Cardiovascular: Regular without murmur, clicks, gallops or rubs. Abdomen: Slightly rounded and soft without organomegaly. No rebound, rigidity or guarding was appreciated. Lymphatic: No lymphadenopathy. Musculoskeletal: Normal curvature of the spine. No gross muscle weakness. Extremities:  No lower extremity edema, ulcerations, tenderness, varicosities or erythema. Muscle size, tone and strength are normal.  No involuntary movements are noted. Skin:  Warm and dry.   Good color, turgor and pigmentation. No lesions or scars. No cyanosis or clubbing  Neurological/Psychiatric: The patient's general behavior, level of consciousness, thought content and emotional status is normal.        Medications:  Scheduled Medications:    folic acid  1 mg Oral Daily    sodium chloride flush  10 mL Intravenous 2 times per day    nicotine  1 patch Transdermal Daily    pantoprazole  40 mg Intravenous BID    And    sodium chloride (PF)  10 mL Intravenous BID    thiamine  100 mg Oral Daily    therapeutic multivitamin-minerals  1 tablet Oral Daily     Continuous Infusions:   PRN MedicationsLORazepam, 1 mg, Q1H PRN    Or  LORazepam, 1 mg, Q1H PRN    Or  LORazepam, 2 mg, Q1H PRN    Or  LORazepam, 2 mg, Q1H PRN    Or  LORazepam, 3 mg, Q1H PRN    Or  LORazepam, 3 mg, Q1H PRN    Or  LORazepam, 4 mg, Q1H PRN    Or  LORazepam, 4 mg, Q1H PRN  sodium chloride flush, 10 mL, PRN  magnesium hydroxide, 30 mL, Daily PRN  ondansetron, 4 mg, Q6H PRN        Diagnostic Labs:  CBC:   Recent Labs     01/10/20  1334 01/11/20  0425   WBC 4.4 3.1*   RBC 4.89 4.59   HGB 15.3 14.4   HCT 46.2 43.9   MCV 94.5 95.6   RDW 13.3 13.0    217     BMP:   Recent Labs     01/10/20  1334      K 4.1   CL 95*   CO2 22   BUN 16   CREATININE 0.88     BNP: No results for input(s): BNP in the last 72 hours. PT/INR:   Recent Labs     01/10/20  1334   PROTIME 9.8   INR 0.9     APTT: No results for input(s): APTT in the last 72 hours. CARDIAC ENZYMES: No results for input(s): CKMB, CKMBINDEX, TROPONINI in the last 72 hours. Invalid input(s): CKTOTAL;3  FASTING LIPID PANEL:No results found for: CHOL, HDL, TRIG  LIVER PROFILE:   Recent Labs     01/10/20  1334   *   *   BILITOT 0.20*   ALKPHOS 82      MICROBIOLOGY: No results found for: CULTURE    Imaging:    Xr Chest Standard (2 Vw)    Result Date: 1/10/2020  1. Prominence of the perihilar vasculature, otherwise, unremarkable chest radiograph.      Us Liver    Result Date:

## 2020-01-11 NOTE — ED NOTES
Pt resting on bed, no acute distress, call light within reach     Blessing Alexander RN  01/10/20 1932

## 2020-01-11 NOTE — ED NOTES
Pt resting on bed, no acute distress, call light within reach     Jaymie Orantes, SHELLEY  01/10/20 2281

## 2020-01-12 LAB
ABSOLUTE EOS #: 0.08 K/UL (ref 0–0.44)
ABSOLUTE IMMATURE GRANULOCYTE: <0.03 K/UL (ref 0–0.3)
ABSOLUTE LYMPH #: 1.47 K/UL (ref 1.1–3.7)
ABSOLUTE MONO #: 0.25 K/UL (ref 0.1–1.2)
ALBUMIN SERPL-MCNC: 4.5 G/DL (ref 3.5–5.2)
ALBUMIN/GLOBULIN RATIO: 1.3 (ref 1–2.5)
ALP BLD-CCNC: 91 U/L (ref 40–129)
ALT SERPL-CCNC: 1295 U/L (ref 5–41)
ANION GAP SERPL CALCULATED.3IONS-SCNC: 12 MMOL/L (ref 9–17)
AST SERPL-CCNC: 1330 U/L
BASOPHILS # BLD: 2 % (ref 0–2)
BASOPHILS ABSOLUTE: 0.05 K/UL (ref 0–0.2)
BILIRUB SERPL-MCNC: 0.36 MG/DL (ref 0.3–1.2)
BILIRUBIN DIRECT: 0.12 MG/DL
BILIRUBIN, INDIRECT: 0.24 MG/DL (ref 0–1)
BUN BLDV-MCNC: 12 MG/DL (ref 6–20)
BUN/CREAT BLD: ABNORMAL (ref 9–20)
CALCIUM SERPL-MCNC: 9.3 MG/DL (ref 8.6–10.4)
CERULOPLASMIN: 22 MG/DL (ref 15–30)
CHLORIDE BLD-SCNC: 92 MMOL/L (ref 98–107)
CO2: 28 MMOL/L (ref 20–31)
CREAT SERPL-MCNC: 0.84 MG/DL (ref 0.7–1.2)
DIFFERENTIAL TYPE: ABNORMAL
EOSINOPHILS RELATIVE PERCENT: 2 % (ref 1–4)
GFR AFRICAN AMERICAN: >60 ML/MIN
GFR NON-AFRICAN AMERICAN: >60 ML/MIN
GFR SERPL CREATININE-BSD FRML MDRD: ABNORMAL ML/MIN/{1.73_M2}
GFR SERPL CREATININE-BSD FRML MDRD: ABNORMAL ML/MIN/{1.73_M2}
GLOBULIN: ABNORMAL G/DL (ref 1.5–3.8)
GLUCOSE BLD-MCNC: 93 MG/DL (ref 70–99)
HCT VFR BLD CALC: 46.4 % (ref 40.7–50.3)
HEMOGLOBIN: 15.7 G/DL (ref 13–17)
IGG: 1375 MG/DL (ref 700–1600)
IGM: 88 MG/DL (ref 40–230)
IMMATURE GRANULOCYTES: 0 %
INR BLD: 0.9
IRON SATURATION: 25 % (ref 20–55)
IRON: 69 UG/DL (ref 59–158)
LYMPHOCYTES # BLD: 43 % (ref 24–43)
MCH RBC QN AUTO: 31.4 PG (ref 25.2–33.5)
MCHC RBC AUTO-ENTMCNC: 33.8 G/DL (ref 28.4–34.8)
MCV RBC AUTO: 92.8 FL (ref 82.6–102.9)
MONOCYTES # BLD: 7 % (ref 3–12)
NRBC AUTOMATED: 0 PER 100 WBC
PDW BLD-RTO: 12.7 % (ref 11.8–14.4)
PLATELET # BLD: 206 K/UL (ref 138–453)
PLATELET ESTIMATE: ABNORMAL
PMV BLD AUTO: 9.5 FL (ref 8.1–13.5)
POTASSIUM SERPL-SCNC: 3.8 MMOL/L (ref 3.7–5.3)
PROTHROMBIN TIME: 9.9 SEC (ref 9–12)
RBC # BLD: 5 M/UL (ref 4.21–5.77)
RBC # BLD: ABNORMAL 10*6/UL
SEG NEUTROPHILS: 46 % (ref 36–65)
SEGMENTED NEUTROPHILS ABSOLUTE COUNT: 1.53 K/UL (ref 1.5–8.1)
SODIUM BLD-SCNC: 132 MMOL/L (ref 135–144)
TOTAL IRON BINDING CAPACITY: 274 UG/DL (ref 250–450)
TOTAL PROTEIN: 7.9 G/DL (ref 6.4–8.3)
UNSATURATED IRON BINDING CAPACITY: 205 UG/DL (ref 112–347)
WBC # BLD: 3.4 K/UL (ref 3.5–11.3)
WBC # BLD: ABNORMAL 10*3/UL

## 2020-01-12 PROCEDURE — 85025 COMPLETE CBC W/AUTO DIFF WBC: CPT

## 2020-01-12 PROCEDURE — 83540 ASSAY OF IRON: CPT

## 2020-01-12 PROCEDURE — 36415 COLL VENOUS BLD VENIPUNCTURE: CPT

## 2020-01-12 PROCEDURE — 80048 BASIC METABOLIC PNL TOTAL CA: CPT

## 2020-01-12 PROCEDURE — 6370000000 HC RX 637 (ALT 250 FOR IP): Performed by: STUDENT IN AN ORGANIZED HEALTH CARE EDUCATION/TRAINING PROGRAM

## 2020-01-12 PROCEDURE — 85610 PROTHROMBIN TIME: CPT

## 2020-01-12 PROCEDURE — 82784 ASSAY IGA/IGD/IGG/IGM EACH: CPT

## 2020-01-12 PROCEDURE — 2580000003 HC RX 258: Performed by: STUDENT IN AN ORGANIZED HEALTH CARE EDUCATION/TRAINING PROGRAM

## 2020-01-12 PROCEDURE — 1200000000 HC SEMI PRIVATE

## 2020-01-12 PROCEDURE — 6360000002 HC RX W HCPCS: Performed by: STUDENT IN AN ORGANIZED HEALTH CARE EDUCATION/TRAINING PROGRAM

## 2020-01-12 PROCEDURE — 83550 IRON BINDING TEST: CPT

## 2020-01-12 PROCEDURE — 80076 HEPATIC FUNCTION PANEL: CPT

## 2020-01-12 PROCEDURE — C9113 INJ PANTOPRAZOLE SODIUM, VIA: HCPCS | Performed by: STUDENT IN AN ORGANIZED HEALTH CARE EDUCATION/TRAINING PROGRAM

## 2020-01-12 PROCEDURE — 99232 SBSQ HOSP IP/OBS MODERATE 35: CPT | Performed by: INTERNAL MEDICINE

## 2020-01-12 PROCEDURE — 81256 HFE GENE: CPT

## 2020-01-12 PROCEDURE — 99222 1ST HOSP IP/OBS MODERATE 55: CPT | Performed by: INTERNAL MEDICINE

## 2020-01-12 PROCEDURE — 82390 ASSAY OF CERULOPLASMIN: CPT

## 2020-01-12 RX ORDER — PANTOPRAZOLE SODIUM 40 MG/1
40 TABLET, DELAYED RELEASE ORAL
Status: DISCONTINUED | OUTPATIENT
Start: 2020-01-13 | End: 2020-01-15 | Stop reason: HOSPADM

## 2020-01-12 RX ADMIN — TRAMADOL HYDROCHLORIDE 50 MG: 50 TABLET, FILM COATED ORAL at 08:11

## 2020-01-12 RX ADMIN — MULTIPLE VITAMINS W/ MINERALS TAB 1 TABLET: TAB at 08:11

## 2020-01-12 RX ADMIN — OSELTAMIVIR PHOSPHATE 75 MG: 75 CAPSULE ORAL at 20:45

## 2020-01-12 RX ADMIN — OSELTAMIVIR PHOSPHATE 75 MG: 75 CAPSULE ORAL at 08:12

## 2020-01-12 RX ADMIN — Medication 10 ML: at 20:45

## 2020-01-12 RX ADMIN — FOLIC ACID 1 MG: 1 TABLET ORAL at 08:11

## 2020-01-12 RX ADMIN — PANTOPRAZOLE SODIUM 40 MG: 40 INJECTION, POWDER, FOR SOLUTION INTRAVENOUS at 08:12

## 2020-01-12 RX ADMIN — Medication 100 MG: at 08:11

## 2020-01-12 RX ADMIN — Medication 10 ML: at 08:13

## 2020-01-12 RX ADMIN — Medication 10 ML: at 08:12

## 2020-01-12 ASSESSMENT — PAIN SCALES - GENERAL: PAINLEVEL_OUTOF10: 7

## 2020-01-12 NOTE — PLAN OF CARE
Problem: Pain:  Goal: Pain level will decrease  Description  Pain level will decrease  Outcome: Ongoing  Note:   Patient educated on available pain control measures including non-pharmacologic and medications. Pain goal discussed. Whiteboard updated for available time of next administration PRN. Will continue ordered interventions & assess pain.      Goal: Control of acute pain  Description  Control of acute pain  Outcome: Ongoing  Goal: Control of chronic pain  Description  Control of chronic pain  Outcome: Ongoing

## 2020-01-12 NOTE — CARE COORDINATION
Case Management Initial Discharge Plan  Vandana Elise,             Met with:patient to discuss discharge plans. Information verified: address, contacts, phone number, , insurance Yes  PCP: No primary care provider on file. Date of last visit:     Insurance Provider: [employer]    Living Arrangements:      Support Systems:       Home has  stories   stairs to climb to get into front door, stairs to climb to reach second floor  Location of bedroom/bathroom in home     Patient able to perform ADL's:Independent    Current Services (outpatient & in home) none  DME equipment: none  DME provider: n/a      Potential Assistance Needed:       Patient agreeable to home care: No  Stebbins of choice provided:  n/a    Prior SNF/Rehab Placement and Facility: none  Agreeable to SNF/Rehab: No  Stebbins of choice provided: n/a   Evaluation: no    Expected Discharge date:     Patient expects to be discharged to: Follow Up Appointment: Best Day/ Time:      Transportation provider: shona  Transportation arrangements needed for discharge: yes    Readmission Risk              Risk of Unplanned Readmission:        7             Does patient have a readmission risk score greater than 14?: No  If yes, follow-up appointment must be made within 7 days of discharge.      Goals of Care:       Discharge Plan: home          Electronically signed by Magalene Paget, RN on 20 at 6:20 PM

## 2020-01-12 NOTE — CONSULTS
[address] [facility] CONSULT    Patient:   Nicho White   :    1980   Facility:   [facility]   Date:    2020  Admission Dx:  Transaminitis [R74.0]  Requesting physician: Jacinto Jenkins MD  Reason for consult:  Transaminitis       SUBJECTIVE     HISTORY OF PRESENT ILLNESS  This is a 44 y.o.   male who was admitted 1/10/2020 with Transaminitis [R74.0]. We have been asked to see the patient in consultation by Jacinto Jenkins MD for transaminitis. Patient with a history of significant alcohol use presented to the ED with complaints of 2-day history of fever, chills, body aches, nausea and vomiting and cough with yellow sputum production and occasional hemoptysis. Patient was found to have abnormal LFTs ( , ) and elevated ethanol level (0.161) . LFTs initially improved but elevated again today. Acute hepatitis panel nonreactive. GI consulted for transaminitis. Patient denies any recent known sick contacts. No out-of-state/country travel. No OTC or herbal medications. No tattoos or IV drug use. He admits to heavy alcohol use and consuming 1 bottle of vodka per day for approximately the past 1 year. He reports taking 1 Tylenol for joint and body pains, otherwise no acetaminophen use. He reports a one-week history of mid epigastric pain. No known liver or gallbladder issues. Family history of DM otherwise no autoimmune disease. Father with a history of heavy alcohol use but no family history of liver disease, pancreatic cancer or CRC. Patient denies any weight loss. Nausea and vomiting resolved. Patient tolerating diet  Temp 102.2 last evening. OBJECTIVE:     PAST MEDICAL/SURGICAL HISTORY  History reviewed. No pertinent past medical history. No past surgical history on file.     ALLERGIES:  No Known Allergies    HOME MEDICATIONS:  Prior to Admission medications    Not on File IGG    IgM  No results found for: IGM    GGT   No results found for: LABGGT      IMAGING  Xr Chest Standard (2 Vw)    Result Date: 1/10/2020  EXAMINATION: TWO XRAY VIEWS OF THE CHEST 1/10/2020 1:59 pm COMPARISON: None HISTORY: ORDERING SYSTEM PROVIDED HISTORY: Coughing up blood TECHNOLOGIST PROVIDED HISTORY: Coughing up blood Reason for Exam: pt states he has the flu FINDINGS: No lines or tubes. The heart size is normal.  Prominence of the perihilar vasculature is seen. Low lung volumes. The lungs are clear without focal consolidation or pleural effusion. No suspicious pulmonary nodules. No pulmonary edema. No pneumothorax. No acute osseous abnormality. 1. Prominence of the perihilar vasculature, otherwise, unremarkable chest radiograph. Us Liver    Result Date: 1/10/2020  EXAMINATION: RIGHT UPPER QUADRANT ULTRASOUND 1/10/2020 9:36 pm COMPARISON: None. HISTORY: ORDERING SYSTEM PROVIDED HISTORY: transaminits FINDINGS: LIVER:  Liver demonstrates increased echogenicity suggesting fatty infiltration. Liver length is 18.3 cm. BILIARY SYSTEM:  Gallbladder is unremarkable without evidence of pericholecystic fluid, wall thickening or stones. Negative sonographic Marquez's sign. Common bile duct is within normal limits measuring 3 mm. RIGHT KIDNEY: The right kidney is grossly unremarkable without evidence of hydronephrosis. Right renal length is 11.1 cm. PANCREAS:  Visualized portions of the pancreas are unremarkable. OTHER: No evidence of right upper quadrant ascites. Likely hepatic fatty infiltration. IMPRESSION:     Patient with a history of significant alcohol use presented to the ED with complaints of 2-day history of fever, chills, body aches, nausea and vomiting and cough with yellow sputum production and occasional hemoptysis. Patient was found to have abnormal LFTs ( , ) and elevated ethanol level (0.161) . LFTs initially improved but elevated again today.   Acute hepatitis panel nonreactive. GI consulted for transaminitis. 1. Abnormal LFTs - LFT pattern not consistent with alcoholic hepatitis. Suspect viral etiology. Autoimmune will need to be ruled out. Dili less likely       Old records, labs and imaging reviewed. PLAN   1. Follow-up on pending serology for abnormal LFTs. 2.  Trend LFTs and INR  3. Observe for signs of encephalopathy or coagulopathy  4. Supportive care      This plan was formulated in collaboration with Dr. Darien Vance   Thank you for allowing us to participate in the care of your patient. Electronically signed by: Dave FERMIN on 1/12/2020 at 12:24 PM     Please note that this note was generated using a voice recognition dictation software. Although every effort was made to ensure the accuracy of this automated transcription, some errors in transcription may have occurred.

## 2020-01-12 NOTE — PLAN OF CARE
Problem: Pain:  Goal: Pain level will decrease  Description  Pain level will decrease  1/11/2020 2227 by Robert Amaya RN  Outcome: Ongoing  1/11/2020 1005 by Brian Boston RN  Outcome: Ongoing  1/11/2020 1005 by Brian Boston RN  Outcome: Ongoing  Goal: Control of acute pain  Description  Control of acute pain  1/11/2020 2227 by Robert Amaya RN  Outcome: Ongoing  1/11/2020 1005 by Brian Boston RN  Outcome: Ongoing  1/11/2020 1005 by Brian Boston RN  Outcome: Ongoing  Goal: Control of chronic pain  Description  Control of chronic pain  1/11/2020 2227 by Robert Amaya RN  Outcome: Ongoing  1/11/2020 1005 by Brian Boston RN  Outcome: Ongoing  1/11/2020 1005 by Brian Boston RN  Outcome: Ongoing

## 2020-01-12 NOTE — PROGRESS NOTES
IM RESIDENT PROGRESS NOTE        Patient:  Vandana Elise  YOB: 1980    MRN: [id number]     Acct: [de-identified]     Admit date: 1/10/2020    Pt seen and Chart reviewed. Subjective:     Patient seen and examined today  No new acute issues overnight  Looks anxious with hand shivering  LFTs going up  Cough improved  No complaint of chest pain, nausea, vomiting or diaphoresis  Review of system negative except as mentioned above    Diet:  DIET LOW FAT; Low Sodium (2 GM)      Medications:Current Inpatient    Scheduled Meds:   folic acid  1 mg Oral Daily    enoxaparin  40 mg Subcutaneous Daily    oseltamivir  75 mg Oral BID    sodium chloride flush  10 mL Intravenous 2 times per day    nicotine  1 patch Transdermal Daily    pantoprazole  40 mg Intravenous BID    And    sodium chloride (PF)  10 mL Intravenous BID    thiamine  100 mg Oral Daily    therapeutic multivitamin-minerals  1 tablet Oral Daily     Continuous Infusions:  PRN Meds:LORazepam **OR** LORazepam **OR** LORazepam **OR** LORazepam **OR** LORazepam **OR** LORazepam **OR** LORazepam **OR** LORazepam, traMADol, sodium chloride flush, magnesium hydroxide, ondansetron    Objective:    Physical Exam:  Vitals: BP (!) 123/101   Pulse 84   Temp 98.6 °F (37 °C) (Oral)   Resp 14   Ht 5' 5\" (1.651 m)   Wt 162 lb (73.5 kg)   SpO2 96%   BMI 26.96 kg/m²   24 hour intake/output:    Intake/Output Summary (Last 24 hours) at 1/12/2020 1327  Last data filed at 1/12/2020 0647  Gross per 24 hour   Intake 600 ml   Output --   Net 600 ml     Last 3 weights: Wt Readings from Last 3 Encounters:   01/12/20 162 lb (73.5 kg)       Physical Examination:   Constitutional:       Appearance: Normal appearance. HENT:      Head: Normocephalic and atraumatic. Mouth/Throat:      Mouth: Mucous membranes are moist.      Pharynx: Oropharynx is clear.    Eyes:      Extraocular Movements: B-mid flow 75 mg twice daily for 5days, Mucinex  CIWA protocol for DT  Require  consultation for discharge for inpatient/outpatient detox for alcoholism      Lukasz Chi MD             1/12/2020, 1:27 PM

## 2020-01-13 LAB
ABSOLUTE EOS #: 0.21 K/UL (ref 0–0.44)
ABSOLUTE IMMATURE GRANULOCYTE: <0.03 K/UL (ref 0–0.3)
ABSOLUTE LYMPH #: 1.85 K/UL (ref 1.1–3.7)
ABSOLUTE MONO #: 0.29 K/UL (ref 0.1–1.2)
ALBUMIN SERPL-MCNC: 4.5 G/DL (ref 3.5–5.2)
ALBUMIN/GLOBULIN RATIO: 1.3 (ref 1–2.5)
ALP BLD-CCNC: 112 U/L (ref 40–129)
ALT SERPL-CCNC: 1367 U/L (ref 5–41)
ANA REFERENCE RANGE:: ABNORMAL
ANION GAP SERPL CALCULATED.3IONS-SCNC: 14 MMOL/L (ref 9–17)
ANTI DNA DOUBLE STRANDED: 14 IU/ML
ANTI JO-1 IGG: 17 U/ML
ANTI RNP AB: 27 U/ML
ANTI SSA: 188 U/ML
ANTI SSB: 12 U/ML
ANTI-CENTROMERE: 8 U/ML
ANTI-NUCLEAR ANTIBODY (ANA): POSITIVE
ANTI-SCLERODERMA: 36 U/ML
ANTI-SMITH: 21 U/ML
AST SERPL-CCNC: 1003 U/L
BASOPHILS # BLD: 1 % (ref 0–2)
BASOPHILS ABSOLUTE: 0.04 K/UL (ref 0–0.2)
BILIRUB SERPL-MCNC: 0.38 MG/DL (ref 0.3–1.2)
BILIRUBIN DIRECT: 0.14 MG/DL
BILIRUBIN, INDIRECT: 0.24 MG/DL (ref 0–1)
BUN BLDV-MCNC: 12 MG/DL (ref 6–20)
BUN/CREAT BLD: ABNORMAL (ref 9–20)
CALCIUM SERPL-MCNC: 9.3 MG/DL (ref 8.6–10.4)
CHLORIDE BLD-SCNC: 94 MMOL/L (ref 98–107)
CMV IGM: 0.3
CO2: 26 MMOL/L (ref 20–31)
CREAT SERPL-MCNC: 0.83 MG/DL (ref 0.7–1.2)
DIFFERENTIAL TYPE: ABNORMAL
EOSINOPHILS RELATIVE PERCENT: 6 % (ref 1–4)
GFR AFRICAN AMERICAN: >60 ML/MIN
GFR NON-AFRICAN AMERICAN: >60 ML/MIN
GFR SERPL CREATININE-BSD FRML MDRD: ABNORMAL ML/MIN/{1.73_M2}
GFR SERPL CREATININE-BSD FRML MDRD: ABNORMAL ML/MIN/{1.73_M2}
GLOBULIN: ABNORMAL G/DL (ref 1.5–3.8)
GLUCOSE BLD-MCNC: 103 MG/DL (ref 70–99)
HCT VFR BLD CALC: 48.9 % (ref 40.7–50.3)
HEMOGLOBIN: 16.1 G/DL (ref 13–17)
HISTONE ANTIBODY: 25 U/ML
IMMATURE GRANULOCYTES: 0 %
LYMPHOCYTES # BLD: 48 % (ref 24–43)
MCH RBC QN AUTO: 30.7 PG (ref 25.2–33.5)
MCHC RBC AUTO-ENTMCNC: 32.9 G/DL (ref 28.4–34.8)
MCV RBC AUTO: 93.3 FL (ref 82.6–102.9)
MONOCYTES # BLD: 8 % (ref 3–12)
NRBC AUTOMATED: 0 PER 100 WBC
PDW BLD-RTO: 12.7 % (ref 11.8–14.4)
PLATELET # BLD: 193 K/UL (ref 138–453)
PLATELET ESTIMATE: ABNORMAL
PMV BLD AUTO: 9.6 FL (ref 8.1–13.5)
POTASSIUM SERPL-SCNC: 4.5 MMOL/L (ref 3.7–5.3)
RBC # BLD: 5.24 M/UL (ref 4.21–5.77)
RBC # BLD: ABNORMAL 10*6/UL
SEG NEUTROPHILS: 37 % (ref 36–65)
SEGMENTED NEUTROPHILS ABSOLUTE COUNT: 1.42 K/UL (ref 1.5–8.1)
SMOOTH MUSCLE ANTIBODY: NORMAL
SODIUM BLD-SCNC: 134 MMOL/L (ref 135–144)
TOTAL PROTEIN: 8.1 G/DL (ref 6.4–8.3)
WBC # BLD: 3.8 K/UL (ref 3.5–11.3)
WBC # BLD: ABNORMAL 10*3/UL

## 2020-01-13 PROCEDURE — 80048 BASIC METABOLIC PNL TOTAL CA: CPT

## 2020-01-13 PROCEDURE — 36415 COLL VENOUS BLD VENIPUNCTURE: CPT

## 2020-01-13 PROCEDURE — 6370000000 HC RX 637 (ALT 250 FOR IP): Performed by: STUDENT IN AN ORGANIZED HEALTH CARE EDUCATION/TRAINING PROGRAM

## 2020-01-13 PROCEDURE — 1200000000 HC SEMI PRIVATE

## 2020-01-13 PROCEDURE — 99232 SBSQ HOSP IP/OBS MODERATE 35: CPT | Performed by: INTERNAL MEDICINE

## 2020-01-13 PROCEDURE — 85025 COMPLETE CBC W/AUTO DIFF WBC: CPT

## 2020-01-13 PROCEDURE — 6360000002 HC RX W HCPCS: Performed by: STUDENT IN AN ORGANIZED HEALTH CARE EDUCATION/TRAINING PROGRAM

## 2020-01-13 PROCEDURE — 80076 HEPATIC FUNCTION PANEL: CPT

## 2020-01-13 PROCEDURE — 2580000003 HC RX 258: Performed by: STUDENT IN AN ORGANIZED HEALTH CARE EDUCATION/TRAINING PROGRAM

## 2020-01-13 PROCEDURE — APPNB45 APP NON BILLABLE 31-45 MINUTES: Performed by: INTERNAL MEDICINE

## 2020-01-13 PROCEDURE — 6370000000 HC RX 637 (ALT 250 FOR IP): Performed by: INTERNAL MEDICINE

## 2020-01-13 RX ADMIN — PANTOPRAZOLE SODIUM 40 MG: 40 TABLET, DELAYED RELEASE ORAL at 06:29

## 2020-01-13 RX ADMIN — TRAMADOL HYDROCHLORIDE 50 MG: 50 TABLET, FILM COATED ORAL at 20:52

## 2020-01-13 RX ADMIN — OSELTAMIVIR PHOSPHATE 75 MG: 75 CAPSULE ORAL at 08:24

## 2020-01-13 RX ADMIN — Medication 10 ML: at 20:58

## 2020-01-13 RX ADMIN — FOLIC ACID 1 MG: 1 TABLET ORAL at 08:23

## 2020-01-13 RX ADMIN — Medication 100 MG: at 08:24

## 2020-01-13 RX ADMIN — OSELTAMIVIR PHOSPHATE 75 MG: 75 CAPSULE ORAL at 20:52

## 2020-01-13 RX ADMIN — Medication 10 ML: at 08:24

## 2020-01-13 ASSESSMENT — PAIN SCALES - GENERAL: PAINLEVEL_OUTOF10: 6

## 2020-01-13 NOTE — PROGRESS NOTES
THE [facility] AT [place] [facility] Progress Note    Luz Hernandez is a 44 y.o. male patient. Hospitalization Day:3      Chief consult reason: Transaminitis      Subjective: Patient seen and examined. Patient states overall feeling much improved. Tolerating diet. No signs of confusion or GI bleeding      VITALS:  /80   Pulse 70   Temp 97.9 °F (36.6 °C) (Oral)   Resp 16   Ht 5' 5\" (1.651 m)   Wt 162 lb (73.5 kg)   SpO2 97%   BMI 26.96 kg/m²   TEMPERATURE:  Current - Temp: 97.9 °F (36.6 °C);  Max - Temp  Av.4 °F (36.9 °C)  Min: 97.9 °F (36.6 °C)  Max: 98.8 °F (37.1 °C)    Physical Assessment:  General appearance:  alert, cooperative and no distress  Mental Status:  oriented to person, place and time and normal affect  Lungs:  clear to auscultation bilaterally, normal effort  Heart:  regular rate and rhythm, no murmur  Abdomen:  soft, nontender, nondistended, normal bowel sounds, no masses, hepatomegaly, splenomegaly  Extremities:  no edema, redness, tenderness in the calves  Skin:  warm, dry, no gross lesions or rashes    Data Review:  LABS and IMAGING:     CBC  Recent Labs     01/10/20  1334 20  0533 20  0544   WBC 4.4 3.1* 3.4* 3.8   HGB 15.3 14.4 15.7 16.1   MCV 94.5 95.6 92.8 93.3   RDW 13.3 13.0 12.7 12.7    217 206 193       ANEMIA STUDIES  Recent Labs     20  0926 20  0533   LABIRON  --   --  25   TIBC  --   --  274   IRON  --   --  69   FERRITIN 2,450*  --   --    [id number]  --  1179  --    FOLATE  --  15.0  --        BMP  Recent Labs     20  0533 20  0544   * 132* 134*   K 4.2 3.8 4.5   CL 92* 92* 94*   CO2 20 28 26   BUN 16 12 12   CREATININE 0.84 0.84 0.83   GLUCOSE 95 93 103*   CALCIUM 8.9 9.3 9.3       LFTS  Recent Labs     01/10/20  1334 20  0425 20  0533 20  0544   ALKPHOS 82 71 91 112   * 746* 1,295* 1,367*   * 431* 1,330* 1,003*   BILITOT 0.20* 0.32 0.36 0.38

## 2020-01-13 NOTE — PLAN OF CARE
Problem: Pain:  Goal: Pain level will decrease  Description  Pain level will decrease  1/12/2020 2346 by Lester Butcher RN  Outcome: Ongoing  1/12/2020 1651 by Pam Mirza RN  Outcome: Ongoing  Note:   Patient educated on available pain control measures including non-pharmacologic and medications. Pain goal discussed. Whiteboard updated for available time of next administration PRN. Will continue ordered interventions & assess pain.      Goal: Control of acute pain  Description  Control of acute pain  1/12/2020 2346 by Lester Butcher RN  Outcome: Ongoing  1/12/2020 1651 by Pam Mirza RN  Outcome: Ongoing  Goal: Control of chronic pain  Description  Control of chronic pain  1/12/2020 2346 by Lester Butcher RN  Outcome: Ongoing  1/12/2020 1651 by Pam Mirza RN  Outcome: Ongoing

## 2020-01-13 NOTE — PROGRESS NOTES
IM RESIDENT PROGRESS NOTE        Patient:  Vandana Elise  YOB: 1980    MRN: [id number]     Acct: [de-identified]     Admit date: 1/10/2020    Pt seen and Chart reviewed. Subjective:     Patient seen and examined today  No acute issues overnight  still complaining of tenderness and back pain but says much improved  Tolerating oral intake  No signs of confusion      Diet:  DIET LOW FAT; Low Sodium (2 GM)      Medications:Current Inpatient    Scheduled Meds:   pantoprazole  40 mg Oral QAM AC    folic acid  1 mg Oral Daily    enoxaparin  40 mg Subcutaneous Daily    oseltamivir  75 mg Oral BID    sodium chloride flush  10 mL Intravenous 2 times per day    nicotine  1 patch Transdermal Daily    thiamine  100 mg Oral Daily     Continuous Infusions:  PRN Meds:LORazepam **OR** LORazepam **OR** LORazepam **OR** LORazepam **OR** LORazepam **OR** LORazepam **OR** LORazepam **OR** LORazepam, traMADol, sodium chloride flush, magnesium hydroxide, ondansetron    Objective:    Physical Exam:  Vitals: /80   Pulse 70   Temp 97.9 °F (36.6 °C) (Oral)   Resp 16   Ht 5' 5\" (1.651 m)   Wt 162 lb (73.5 kg)   SpO2 97%   BMI 26.96 kg/m²   24 hour intake/output:    Intake/Output Summary (Last 24 hours) at 1/13/2020 1557  Last data filed at 1/13/2020 0900  Gross per 24 hour   Intake 200 ml   Output --   Net 200 ml     Last 3 weights: Wt Readings from Last 3 Encounters:   01/12/20 162 lb (73.5 kg)       Physical Examination:   Constitutional:       Appearance: Normal appearance. HENT:      Head: Normocephalic and atraumatic. Mouth/Throat:      Mouth: Mucous membranes are moist.      Pharynx: Oropharynx is clear. Eyes:      Extraocular Movements: Extraocular movements intact. Pupils: Pupils are equal, round, and reactive to light. Neck:      Musculoskeletal: Normal range of motion and neck supple.    Cardiovascular:      Rate

## 2020-01-14 VITALS
OXYGEN SATURATION: 97 % | HEART RATE: 59 BPM | SYSTOLIC BLOOD PRESSURE: 116 MMHG | WEIGHT: 158.3 LBS | HEIGHT: 65 IN | RESPIRATION RATE: 16 BRPM | BODY MASS INDEX: 26.37 KG/M2 | TEMPERATURE: 98.1 F | DIASTOLIC BLOOD PRESSURE: 93 MMHG

## 2020-01-14 LAB
ABSOLUTE EOS #: 0.28 K/UL (ref 0–0.4)
ABSOLUTE IMMATURE GRANULOCYTE: 0 K/UL (ref 0–0.3)
ABSOLUTE LYMPH #: 2.31 K/UL (ref 1–4.8)
ABSOLUTE MONO #: 0.42 K/UL (ref 0.1–0.8)
ALBUMIN SERPL-MCNC: 4.3 G/DL (ref 3.5–5.2)
ALBUMIN/GLOBULIN RATIO: 1.3 (ref 1–2.5)
ALP BLD-CCNC: 100 U/L (ref 40–129)
ALT SERPL-CCNC: 1097 U/L (ref 5–41)
ANION GAP SERPL CALCULATED.3IONS-SCNC: 14 MMOL/L (ref 9–17)
AST SERPL-CCNC: 584 U/L
BASOPHILS # BLD: 0 % (ref 0–2)
BASOPHILS ABSOLUTE: 0 K/UL (ref 0–0.2)
BILIRUB SERPL-MCNC: 0.43 MG/DL (ref 0.3–1.2)
BILIRUBIN DIRECT: 0.13 MG/DL
BILIRUBIN, INDIRECT: 0.3 MG/DL (ref 0–1)
BUN BLDV-MCNC: 16 MG/DL (ref 6–20)
BUN/CREAT BLD: ABNORMAL (ref 9–20)
CALCIUM SERPL-MCNC: 9.2 MG/DL (ref 8.6–10.4)
CHLORIDE BLD-SCNC: 97 MMOL/L (ref 98–107)
CO2: 25 MMOL/L (ref 20–31)
CREAT SERPL-MCNC: 0.83 MG/DL (ref 0.7–1.2)
DIFFERENTIAL TYPE: ABNORMAL
EBV EARLY ANTIGEN IGG: 129 U/ML
EBV INTERPRETATION: ABNORMAL
EBV NUCLEAR AG AB: 857 U/ML
EOSINOPHILS RELATIVE PERCENT: 6 % (ref 1–4)
EPSTEIN-BARR VCA IGG: 973 U/ML
EPSTEIN-BARR VCA IGM: 25 U/ML
GFR AFRICAN AMERICAN: >60 ML/MIN
GFR NON-AFRICAN AMERICAN: >60 ML/MIN
GFR SERPL CREATININE-BSD FRML MDRD: ABNORMAL ML/MIN/{1.73_M2}
GFR SERPL CREATININE-BSD FRML MDRD: ABNORMAL ML/MIN/{1.73_M2}
GLOBULIN: ABNORMAL G/DL (ref 1.5–3.8)
GLUCOSE BLD-MCNC: 106 MG/DL (ref 70–99)
HCT VFR BLD CALC: 44.5 % (ref 40.7–50.3)
HEMOGLOBIN: 15.2 G/DL (ref 13–17)
HERPES TYPE 1/2 IGM COMBINED: 0.8
IMMATURE GRANULOCYTES: 0 %
INR BLD: 0.9
LYMPHOCYTES # BLD: 49 % (ref 24–44)
MCH RBC QN AUTO: 31.7 PG (ref 25.2–33.5)
MCHC RBC AUTO-ENTMCNC: 34.2 G/DL (ref 28.4–34.8)
MCV RBC AUTO: 92.9 FL (ref 82.6–102.9)
MONOCYTES # BLD: 9 % (ref 1–7)
MORPHOLOGY: NORMAL
NRBC AUTOMATED: 0 PER 100 WBC
PDW BLD-RTO: 12.5 % (ref 11.8–14.4)
PLATELET # BLD: 205 K/UL (ref 138–453)
PLATELET ESTIMATE: ABNORMAL
PMV BLD AUTO: 10.1 FL (ref 8.1–13.5)
POTASSIUM SERPL-SCNC: 4.1 MMOL/L (ref 3.7–5.3)
PROTHROMBIN TIME: 9.8 SEC (ref 9–12)
RBC # BLD: 4.79 M/UL (ref 4.21–5.77)
RBC # BLD: ABNORMAL 10*6/UL
SEG NEUTROPHILS: 36 % (ref 36–66)
SEGMENTED NEUTROPHILS ABSOLUTE COUNT: 1.69 K/UL (ref 1.8–7.7)
SODIUM BLD-SCNC: 136 MMOL/L (ref 135–144)
TOTAL PROTEIN: 7.6 G/DL (ref 6.4–8.3)
TSH SERPL DL<=0.05 MIU/L-ACNC: 1.43 MIU/L (ref 0.3–5)
WBC # BLD: 4.7 K/UL (ref 3.5–11.3)
WBC # BLD: ABNORMAL 10*3/UL

## 2020-01-14 PROCEDURE — 1200000000 HC SEMI PRIVATE

## 2020-01-14 PROCEDURE — APPNB45 APP NON BILLABLE 31-45 MINUTES: Performed by: INTERNAL MEDICINE

## 2020-01-14 PROCEDURE — 6370000000 HC RX 637 (ALT 250 FOR IP): Performed by: INTERNAL MEDICINE

## 2020-01-14 PROCEDURE — 6370000000 HC RX 637 (ALT 250 FOR IP): Performed by: STUDENT IN AN ORGANIZED HEALTH CARE EDUCATION/TRAINING PROGRAM

## 2020-01-14 PROCEDURE — 99232 SBSQ HOSP IP/OBS MODERATE 35: CPT | Performed by: INTERNAL MEDICINE

## 2020-01-14 PROCEDURE — 85610 PROTHROMBIN TIME: CPT

## 2020-01-14 PROCEDURE — 80076 HEPATIC FUNCTION PANEL: CPT

## 2020-01-14 PROCEDURE — 80048 BASIC METABOLIC PNL TOTAL CA: CPT

## 2020-01-14 PROCEDURE — 36415 COLL VENOUS BLD VENIPUNCTURE: CPT

## 2020-01-14 PROCEDURE — 85025 COMPLETE CBC W/AUTO DIFF WBC: CPT

## 2020-01-14 PROCEDURE — 84443 ASSAY THYROID STIM HORMONE: CPT

## 2020-01-14 PROCEDURE — 2580000003 HC RX 258: Performed by: STUDENT IN AN ORGANIZED HEALTH CARE EDUCATION/TRAINING PROGRAM

## 2020-01-14 RX ADMIN — TRAMADOL HYDROCHLORIDE 50 MG: 50 TABLET, FILM COATED ORAL at 08:04

## 2020-01-14 RX ADMIN — TRAMADOL HYDROCHLORIDE 50 MG: 50 TABLET, FILM COATED ORAL at 16:19

## 2020-01-14 RX ADMIN — Medication 100 MG: at 09:28

## 2020-01-14 RX ADMIN — OSELTAMIVIR PHOSPHATE 75 MG: 75 CAPSULE ORAL at 09:28

## 2020-01-14 RX ADMIN — Medication 10 ML: at 09:29

## 2020-01-14 RX ADMIN — PANTOPRAZOLE SODIUM 40 MG: 40 TABLET, DELAYED RELEASE ORAL at 07:13

## 2020-01-14 RX ADMIN — FOLIC ACID 1 MG: 1 TABLET ORAL at 09:27

## 2020-01-14 RX ADMIN — OSELTAMIVIR PHOSPHATE 75 MG: 75 CAPSULE ORAL at 22:58

## 2020-01-14 ASSESSMENT — PAIN DESCRIPTION - PROGRESSION: CLINICAL_PROGRESSION: NOT CHANGED

## 2020-01-14 ASSESSMENT — PAIN SCALES - GENERAL
PAINLEVEL_OUTOF10: 7
PAINLEVEL_OUTOF10: 4
PAINLEVEL_OUTOF10: 7
PAINLEVEL_OUTOF10: 2
PAINLEVEL_OUTOF10: 5

## 2020-01-14 ASSESSMENT — PAIN DESCRIPTION - FREQUENCY: FREQUENCY: CONTINUOUS

## 2020-01-14 ASSESSMENT — PAIN DESCRIPTION - DESCRIPTORS: DESCRIPTORS: DULL

## 2020-01-14 ASSESSMENT — PAIN DESCRIPTION - ONSET: ONSET: ON-GOING

## 2020-01-14 ASSESSMENT — PAIN DESCRIPTION - PAIN TYPE: TYPE: ACUTE PAIN

## 2020-01-14 ASSESSMENT — PAIN DESCRIPTION - LOCATION: LOCATION: BACK

## 2020-01-14 ASSESSMENT — PAIN DESCRIPTION - ORIENTATION: ORIENTATION: LOWER

## 2020-01-14 ASSESSMENT — PAIN - FUNCTIONAL ASSESSMENT: PAIN_FUNCTIONAL_ASSESSMENT: ACTIVITIES ARE NOT PREVENTED

## 2020-01-14 NOTE — PROGRESS NOTES
THE [facility] AT [place] [facility] Progress Note    Nelly Rincon is a 44 y.o. male patient. Hospitalization Day:4      Chief consult reason: Transaminitis      Subjective: Patient seen and examined. Feels well today and continues to improve daily. Tolerating diet and having BM. VITALS:  /86   Pulse 66   Temp 98.1 °F (36.7 °C)   Resp 20   Ht 5' 5\" (1.651 m)   Wt 158 lb 4.8 oz (71.8 kg)   SpO2 97%   BMI 26.34 kg/m²   TEMPERATURE:  Current - Temp: 98.1 °F (36.7 °C); Max - Temp  Av.1 °F (36.7 °C)  Min: 98.1 °F (36.7 °C)  Max: 98.1 °F (36.7 °C)    Physical Assessment:  General appearance:  alert, cooperative and no distress  Mental Status:  oriented to person, place and time and normal affect  Lungs:  clear to auscultation bilaterally, normal effort  Heart:  regular rate and rhythm, no murmur  Abdomen:  soft, nontender, nondistended, normal bowel sounds  Extremities:  no edema, redness, tenderness in the calves  Skin:  warm, dry, no gross lesions or rashes    Data Review:  LABS and IMAGING:     CBC  Recent Labs     20  0544 20  0451   WBC 3.4* 3.8 4.7   HGB 15.7 16.1 15.2   MCV 92.8 93.3 92.9   RDW 12.7 12.7 12.5    193 205       ANEMIA STUDIES  Recent Labs     20  0533   LABIRON 25   TIBC 274   IRON 69       BMP  Recent Labs     20  0544 20  0451   * 134* 136   K 3.8 4.5 4.1   CL 92* 94* 97*   CO2 28 26 25   BUN 12 12 16   CREATININE 0.84 0.83 0.83   GLUCOSE 93 103* 106*   CALCIUM 9.3 9.3 9.2       LFTS  Recent Labs     20  0544 20  0653   ALKPHOS 91 112 100   ALT 1,295* 1,367* 1,097*   AST 1,330* 1,003* 584*   BILITOT 0.36 0.38 0.43   BILIDIR 0.12 0.14 0.13   LABALBU 4.5 4.5 4.3       AMYLASE/LIPASE/AMMONIA  No results for input(s): AMYLASE, LIPASE, AMMONIA in the last 72 hours.     Acute Hepatitis Panel   Lab Results   Component Value Date    HEPBSAG NONREACTIVE 01/10/2020    HEPCAB NONREACTIVE unremarkable. OTHER: No evidence of right upper quadrant ascites. Likely hepatic fatty infiltration. Active Problems:    Transaminitis    Alcoholic hepatitis without ascites    Cough with hemoptysis  Resolved Problems:    * No resolved hospital problems. *       GI Assessment:    Patient with a history of significant alcohol use presented to the ED with complaints of 2-day history of fever, chills, body aches, nausea and vomiting and cough with yellow sputum production and occasional hemoptysis. Patient was found to have abnormal LFTs ( , ) and elevated ethanol level (0.161) . LFTs initially improved but elevated again today. Acute hepatitis panel nonreactive. GI consulted for transaminitis.      1. Abnormal LFTs - LFT pattern not consistent with alcoholic hepatitis. Suspect viral etiology. Autoimmune will need to be ruled out. Dili less likely     2. Influenza- On Tamiflu     01/14/2020 LFTs improved today. Etiology for elevation remains unclear. FLEX positive Comprehensive work-up remains in progress. No signs of HE or coagulopathy  Recommend watching one more day to see if LFTs continue to improve    Plan of care:  1. Follow-up on pending serology for abnormal LFTs. 2.  Trend LFTs and INR  3. Observe for signs of encephalopathy or coagulopathy  4. Supportive care  5. If dx work up [facility] and liver enzymes continue to be abnormal, may need liver bx     Please feel free to contact me with any questions or concerns. Thank you for allowing me to participate in the care of your patient. Ana Jovel, TOOTIE - CNP on 1/14/2020 at 11:07 1905 [address] [facility]    Please note that this note was generated using a voice recognition dictation software. Although every effort was made to ensure the accuracy of this automated transcription, some errors in transcription may have occurred.

## 2020-01-14 NOTE — PROGRESS NOTES
and Rhythm: Normal rate and regular rhythm. Heart sounds: No murmur. Pulmonary:      Effort: Pulmonary effort is normal. No respiratory distress. Breath sounds: Normal breath sounds. Chest:      Chest wall: No tenderness. Abdominal:      General: Abdomen is flat. There is no distension. Palpations: Abdomen is soft. Tenderness: There is tenderness. There is no guarding. Comments: Hepatomegaly   Musculoskeletal: Normal range of motion. General: No swelling or tenderness. Skin:     General: Skin is warm and dry. Neurological:      General: No focal deficit present. Mental Status: He is alert and oriented to person, place, and time. Psychiatric:         Mood and Affect: Mood normal.         Behavior: Behavior normal.         Thought Content: Thought content normal.         Judgment: Judgment normal.     Labs:-    CBC:   Recent Labs     01/12/20  0533 01/13/20  0544 01/14/20  0451   WBC 3.4* 3.8 4.7   HGB 15.7 16.1 15.2    193 205     BMP:    Recent Labs     01/12/20  0533 01/13/20  0544 01/14/20  0451   * 134* 136   K 3.8 4.5 4.1   CL 92* 94* 97*   CO2 28 26 25   BUN 12 12 16   CREATININE 0.84 0.83 0.83   GLUCOSE 93 103* 106*     Calcium:  Recent Labs     01/14/20  0451   CALCIUM 9.2     INR:   Recent Labs     01/14/20  0653   INR 0.9     Hepatic:   Recent Labs     01/14/20  0653   ALKPHOS 100   ALT 1,097*   *   PROT 7.6   BILITOT 0.43   BILIDIR 0.13   LABALBU 4.3         Assessment and Plan:  Alcohol hepatitis -ALT and AST trending down, will continue to follow, monitor synthetic function. Every other PT/INR. May need biopsy if diagnosis not made on testing.   to work with the patient to get placement in rehab  Ferritin in 2000 will check hemochromatosis DNA  Influenza B detected by PCR  FLEX positive and Anti   We will consult gastroenterology for further recommendations  EBV positive, HSV negative, CMV antibodies 0.3  Influenza

## 2020-01-15 LAB
ABSOLUTE EOS #: 0.35 K/UL (ref 0–0.44)
ABSOLUTE IMMATURE GRANULOCYTE: 0.07 K/UL (ref 0–0.3)
ABSOLUTE LYMPH #: 2.62 K/UL (ref 1.1–3.7)
ABSOLUTE MONO #: 0.76 K/UL (ref 0.1–1.2)
ALBUMIN SERPL-MCNC: 4.2 G/DL (ref 3.5–5.2)
ALBUMIN/GLOBULIN RATIO: 1.1 (ref 1–2.5)
ALP BLD-CCNC: 114 U/L (ref 40–129)
ALT SERPL-CCNC: 943 U/L (ref 5–41)
ANION GAP SERPL CALCULATED.3IONS-SCNC: 14 MMOL/L (ref 9–17)
AST SERPL-CCNC: 415 U/L
BASOPHILS # BLD: 1 % (ref 0–2)
BASOPHILS ABSOLUTE: 0.07 K/UL (ref 0–0.2)
BILIRUB SERPL-MCNC: 0.36 MG/DL (ref 0.3–1.2)
BILIRUBIN DIRECT: <0.08 MG/DL
BILIRUBIN, INDIRECT: ABNORMAL MG/DL (ref 0–1)
BUN BLDV-MCNC: 19 MG/DL (ref 6–20)
BUN/CREAT BLD: ABNORMAL (ref 9–20)
CALCIUM SERPL-MCNC: 9.3 MG/DL (ref 8.6–10.4)
CHLORIDE BLD-SCNC: 98 MMOL/L (ref 98–107)
CO2: 23 MMOL/L (ref 20–31)
CREAT SERPL-MCNC: 0.88 MG/DL (ref 0.7–1.2)
DIFFERENTIAL TYPE: ABNORMAL
EOSINOPHILS RELATIVE PERCENT: 5 % (ref 1–4)
GFR AFRICAN AMERICAN: >60 ML/MIN
GFR NON-AFRICAN AMERICAN: >60 ML/MIN
GFR SERPL CREATININE-BSD FRML MDRD: ABNORMAL ML/MIN/{1.73_M2}
GFR SERPL CREATININE-BSD FRML MDRD: ABNORMAL ML/MIN/{1.73_M2}
GLOBULIN: ABNORMAL G/DL (ref 1.5–3.8)
GLUCOSE BLD-MCNC: 101 MG/DL (ref 70–99)
HCT VFR BLD CALC: 46.1 % (ref 40.7–50.3)
HEMOGLOBIN: 15.1 G/DL (ref 13–17)
IMMATURE GRANULOCYTES: 1 %
LYMPHOCYTES # BLD: 38 % (ref 24–43)
MCH RBC QN AUTO: 30.9 PG (ref 25.2–33.5)
MCHC RBC AUTO-ENTMCNC: 32.8 G/DL (ref 28.4–34.8)
MCV RBC AUTO: 94.5 FL (ref 82.6–102.9)
MONOCYTES # BLD: 11 % (ref 3–12)
MORPHOLOGY: NORMAL
NRBC AUTOMATED: 0 PER 100 WBC
PDW BLD-RTO: 12.7 % (ref 11.8–14.4)
PLATELET # BLD: 255 K/UL (ref 138–453)
PLATELET ESTIMATE: ABNORMAL
PMV BLD AUTO: 9.9 FL (ref 8.1–13.5)
POTASSIUM SERPL-SCNC: 4.2 MMOL/L (ref 3.7–5.3)
RBC # BLD: 4.88 M/UL (ref 4.21–5.77)
RBC # BLD: ABNORMAL 10*6/UL
SEG NEUTROPHILS: 44 % (ref 36–65)
SEGMENTED NEUTROPHILS ABSOLUTE COUNT: 3.03 K/UL (ref 1.5–8.1)
SODIUM BLD-SCNC: 135 MMOL/L (ref 135–144)
TOTAL PROTEIN: 7.9 G/DL (ref 6.4–8.3)
WBC # BLD: 6.9 K/UL (ref 3.5–11.3)
WBC # BLD: ABNORMAL 10*3/UL

## 2020-01-15 PROCEDURE — 6370000000 HC RX 637 (ALT 250 FOR IP): Performed by: STUDENT IN AN ORGANIZED HEALTH CARE EDUCATION/TRAINING PROGRAM

## 2020-01-15 PROCEDURE — 99238 HOSP IP/OBS DSCHRG MGMT 30/<: CPT | Performed by: INTERNAL MEDICINE

## 2020-01-15 PROCEDURE — 6370000000 HC RX 637 (ALT 250 FOR IP): Performed by: INTERNAL MEDICINE

## 2020-01-15 PROCEDURE — 36415 COLL VENOUS BLD VENIPUNCTURE: CPT

## 2020-01-15 PROCEDURE — 80048 BASIC METABOLIC PNL TOTAL CA: CPT

## 2020-01-15 PROCEDURE — 85025 COMPLETE CBC W/AUTO DIFF WBC: CPT

## 2020-01-15 PROCEDURE — 80076 HEPATIC FUNCTION PANEL: CPT

## 2020-01-15 RX ORDER — LANOLIN ALCOHOL/MO/W.PET/CERES
100 CREAM (GRAM) TOPICAL DAILY
Qty: 30 TABLET | Refills: 3 | Status: SHIPPED | OUTPATIENT
Start: 2020-01-16

## 2020-01-15 RX ORDER — OSELTAMIVIR PHOSPHATE 75 MG/1
75 CAPSULE ORAL 2 TIMES DAILY
Qty: 1 CAPSULE | Refills: 0 | Status: SHIPPED | OUTPATIENT
Start: 2020-01-15 | End: 2020-01-16

## 2020-01-15 RX ORDER — PANTOPRAZOLE SODIUM 40 MG/1
40 TABLET, DELAYED RELEASE ORAL
Qty: 30 TABLET | Refills: 3 | Status: SHIPPED | OUTPATIENT
Start: 2020-01-16

## 2020-01-15 RX ORDER — FOLIC ACID 1 MG/1
1 TABLET ORAL DAILY
Qty: 30 TABLET | Refills: 3 | Status: SHIPPED | OUTPATIENT
Start: 2020-01-16

## 2020-01-15 RX ADMIN — TRAMADOL HYDROCHLORIDE 50 MG: 50 TABLET, FILM COATED ORAL at 12:10

## 2020-01-15 RX ADMIN — OSELTAMIVIR PHOSPHATE 75 MG: 75 CAPSULE ORAL at 08:46

## 2020-01-15 RX ADMIN — FOLIC ACID 1 MG: 1 TABLET ORAL at 12:18

## 2020-01-15 RX ADMIN — Medication 100 MG: at 08:46

## 2020-01-15 RX ADMIN — TRAMADOL HYDROCHLORIDE 50 MG: 50 TABLET, FILM COATED ORAL at 01:19

## 2020-01-15 RX ADMIN — PANTOPRAZOLE SODIUM 40 MG: 40 TABLET, DELAYED RELEASE ORAL at 08:47

## 2020-01-15 ASSESSMENT — PAIN SCALES - GENERAL
PAINLEVEL_OUTOF10: 6
PAINLEVEL_OUTOF10: 7

## 2020-01-15 NOTE — PROGRESS NOTES
IM RESIDENT PROGRESS NOTE        Patient:  Faith Sin  YOB: 1980    MRN: [id number]     Acct: [de-identified]     Admit date: 1/10/2020    Pt seen and Chart reviewed. Subjective:     Patient seen and examined today  Patient says he has no new complaints overnight  He is still feeling some generalized body aches  Transaminates is getting slightly better        Diet:  DIET LOW FAT; Low Sodium (2 GM)      Medications:Current Inpatient    Scheduled Meds:   pantoprazole  40 mg Oral QAM AC    folic acid  1 mg Oral Daily    enoxaparin  40 mg Subcutaneous Daily    oseltamivir  75 mg Oral BID    sodium chloride flush  10 mL Intravenous 2 times per day    nicotine  1 patch Transdermal Daily    thiamine  100 mg Oral Daily     Continuous Infusions:  PRN Meds:LORazepam **OR** LORazepam **OR** LORazepam **OR** LORazepam **OR** LORazepam **OR** LORazepam **OR** LORazepam **OR** LORazepam, traMADol, sodium chloride flush, magnesium hydroxide, ondansetron    Objective:    Physical Exam:  Vitals: BP (!) 116/93   Pulse 59   Temp 98.1 °F (36.7 °C) (Oral)   Resp 16   Ht 5' 5\" (1.651 m)   Wt 158 lb 4.8 oz (71.8 kg)   SpO2 97%   BMI 26.34 kg/m²   24 hour intake/output:    Intake/Output Summary (Last 24 hours) at 1/15/2020 1026  Last data filed at 1/14/2020 1841  Gross per 24 hour   Intake 1120 ml   Output --   Net 1120 ml     Last 3 weights: Wt Readings from Last 3 Encounters:   01/14/20 158 lb 4.8 oz (71.8 kg)       Physical Examination:   Constitutional:       Appearance: Normal appearance. HENT:      Head: Normocephalic and atraumatic. Mouth/Throat:      Mouth: Mucous membranes are moist.      Pharynx: Oropharynx is clear. Eyes:      Extraocular Movements: Extraocular movements intact. Pupils: Pupils are equal, round, and reactive to light. Neck:      Musculoskeletal: Normal range of motion and neck supple. Cardiovascular:      Rate and Rhythm: Normal rate and regular rhythm. Heart sounds: No murmur. Pulmonary:      Effort: Pulmonary effort is normal. No respiratory distress. Breath sounds: Normal breath sounds. Chest:      Chest wall: No tenderness. Abdominal:      General: Abdomen is flat. There is no distension. Palpations: Abdomen is soft. Tenderness: There is tenderness. There is no guarding. Comments: Hepatomegaly   Musculoskeletal: Normal range of motion. General: No swelling or tenderness. Skin:     General: Skin is warm and dry. Neurological:      General: No focal deficit present. Mental Status: He is alert and oriented to person, place, and time. Psychiatric:         Mood and Affect: Mood normal.         Behavior: Behavior normal.         Thought Content: Thought content normal.         Judgment: Judgment normal.     Labs:-    CBC:   Recent Labs     01/13/20  0544 01/14/20  0451 01/15/20  0516   WBC 3.8 4.7 6.9   HGB 16.1 15.2 15.1    205 255     BMP:    Recent Labs     01/13/20  0544 01/14/20  0451 01/15/20  0516   * 136 135   K 4.5 4.1 4.2   CL 94* 97* 98   CO2 26 25 23   BUN 12 16 19   CREATININE 0.83 0.83 0.88   GLUCOSE 103* 106* 101*     Calcium:  Recent Labs     01/15/20  0516   CALCIUM 9.3     INR:   Recent Labs     01/14/20  0653   INR 0.9     Hepatic:   Recent Labs     01/15/20  0516   ALKPHOS 114   *   *   PROT 7.9   BILITOT 0.36   BILIDIR <0.08   LABALBU 4.2         Assessment and Plan:  Alcohol hepatitis -ALT and AST trending down, will continue to follow, monitor synthetic function. Every other PT/INR. May need biopsy if diagnosis not made on testing.   to work with the patient to get placement in rehab  Ferritin in 2000 will check hemochromatosis DNA  Influenza B detected by PCR  FLEX positive and Anti   We will consult gastroenterology for further recommendations  EBV positive, HSV negative, CMV

## 2020-01-15 NOTE — PROGRESS NOTES
CLINICAL PHARMACY NOTE: MEDS TO [address] Select Patient?: No  Total # of Prescriptions Filled: 5   The following medications were delivered to the patient:  · FOLIC ACID   · TAMIFLU   · VIT B   · PROTONIX   · NICOTINE PATCH   Total # of Interventions Completed: 0  Time Spent (min): 0    Additional Documentation:

## 2020-01-15 NOTE — PROGRESS NOTES
THE [facility] AT [place] [facility] Progress Note    Elder Coronel is a 44 y.o. male patient. Hospitalization Day:5      Chief consult reason: Transaminitis    Subjective: Patient seen and examined. Feels well today and continues to improve daily. Eating and drinking without any nausea and vomiting. Denied abdominal pain as well. Liver enzymes continues to improve but not remarkable improvement noticed so far. Work up negative so far except for positive FLEX/SSA Ab which seems insignificant. Ferritin is elevated but other iron studies WNL. VITALS:  BP (!) 116/93   Pulse 59   Temp 98.1 °F (36.7 °C) (Oral)   Resp 16   Ht 5' 5\" (1.651 m)   Wt 158 lb 4.8 oz (71.8 kg)   SpO2 97%   BMI 26.34 kg/m²   TEMPERATURE:  Current - Temp: 98.1 °F (36.7 °C);  Max - Temp  Av.3 °F (36.8 °C)  Min: 98.1 °F (36.7 °C)  Max: 98.4 °F (36.9 °C)    Physical Assessment:  General appearance:  alert, cooperative and no distress  Mental Status:  oriented to person, place and time and normal affect  Lungs:  clear to auscultation bilaterally, normal effort  Heart:  regular rate and rhythm, no murmur  Abdomen:  soft, nontender, nondistended, normal bowel sounds  Extremities:  no edema, redness, tenderness in the calves  Skin:  warm, dry, no gross lesions or rashes    Data Review:  LABS and IMAGING:     CBC  Recent Labs     20  0544 20  0451 01/15/20  0516   WBC 3.8 4.7 6.9   HGB 16.1 15.2 15.1   MCV 93.3 92.9 94.5   RDW 12.7 12.5 12.7    205 255     BMP  Recent Labs     20  0544 20  0451 01/15/20  0516   * 136 135   K 4.5 4.1 4.2   CL 94* 97* 98   CO2 26 25 23   BUN 12 16 19   CREATININE 0.83 0.83 0.88   GLUCOSE 103* 106* 101*   CALCIUM 9.3 9.2 9.3     LFTS  Recent Labs     20  0544 20  0653 01/15/20  0516   ALKPHOS 112 100 114   ALT 1,367* 1,097* 943*   AST 1,003* 584* 415*   BILITOT 0.38 0.43 0.36   BILIDIR 0.14 0.13 <0.08   LABALBU 4.5 4.3 4.2     Acute Hepatitis Panel   Lab Results Component Value Date    HEPBSAG NONREACTIVE 01/10/2020    HEPCAB NONREACTIVE 01/10/2020    HEPBIGM NONREACTIVE 01/10/2020    HEPAIGM NONREACTIVE 01/10/2020     LIVER WORK UP:  AFP  No results found for: AFP  Alpha 1 antitrypsin   No results found for: A1A  Anti - Liver/Kidney Ab  No results found for: LIVER-KIDNEYMICROSOMALAB  FLEX  Lab Results   Component Value Date    FLEX POSITIVE 01/11/2020     AMA  No results found for: MITOAB     ASMA  Lab Results   Component Value Date    SMOOTHMUSCAB NONE DETECTED 01/11/2020     Ceruloplasmin  Lab Results   Component Value Date    CERULOPLSM 22 01/12/2020     Celiac panel  No results found for: TISSTRNTIIGG, TTGIGA, IGA     IgG  Lab Results   Component Value Date    IGG 1375 01/12/2020     IgM  Lab Results   Component Value Date    IGM 88 01/12/2020     Celiac panel  No results found for: TISSTRNTIIGG, TTGIGA, IGA    PT/INR  Recent Labs     01/14/20  0653   PROTIME 9.8   INR 0.9     Cancer Markers:  CEA:  No results for input(s): CEA in the last 72 hours. Ca 125:  No results for input(s):  in the last 72 hours. Ca 19-9:   No results for input(s):  in the last 72 hours. AFP: No results for input(s): AFP in the last 72 hours. Lactic acid:No results for input(s): LACTACIDWB in the last 72 hours. Radiology Review:    Us Liver    Result Date: 1/10/2020  EXAMINATION: RIGHT UPPER QUADRANT ULTRASOUND 1/10/2020 9:36 pm COMPARISON: None. HISTORY: ORDERING SYSTEM PROVIDED HISTORY: transaminits FINDINGS: LIVER:  Liver demonstrates increased echogenicity suggesting fatty infiltration. Liver length is 18.3 cm. BILIARY SYSTEM:  Gallbladder is unremarkable without evidence of pericholecystic fluid, wall thickening or stones. Negative sonographic Marquez's sign. Common bile duct is within normal limits measuring 3 mm. RIGHT KIDNEY: The right kidney is grossly unremarkable without evidence of hydronephrosis. Right renal length is 11.1 cm.  PANCREAS:  Visualized portions of

## 2020-01-17 LAB
C282Y HEMOCHROMATOSIS MUT: NEGATIVE
H63D HEMOCHROMATOSIS MUT: NEGATIVE
HEMOCHROMATOSIS MUTATION INT: NORMAL
HEMOCHROMATOSIS SPECIMEN: NORMAL
S65C HEMOCHROMATOSIS MUT: NEGATIVE

## 2020-02-04 NOTE — DISCHARGE SUMMARY
[address]     Department of Internal Medicine - Staff Internal Medicine Teaching Service    INPATIENT DISCHARGE SUMMARY      Patient Identification:  Nicho White is a 44 y.o. male. :  1980  MRN: [id number]     Acct: [de-identified]   PCP: No primary care provider on file. Admit Date:  1/10/2020  Discharge date and time: 1/15/2020  5:36 PM   Attending Provider: No att. providers found                                     [address] Problem Lists:  Active Problems:    Transaminitis    Alcoholic hepatitis without ascites    Cough with hemoptysis  Resolved Problems:    * No resolved hospital problems. [facility][1] STAY     Brief Inpatient course:   Nicho White is a 44 y.o. male who was admitted for the management of <principal problem not specified>, presented to the emergency department with a chief complaint of hemoptysis. Patient states that sometimes when he coughs he brings up yellowish sputum stained with blood. He says that for the past couple of days he has been cough with sputum production. He also complains of having fever, chills, body aches, nausea and vomiting with mostly food particles but no blood. He says that there are people sick around him. He denies having any diarrhea or constipation. He denies any palpitations or shortness of breath. He says that he has been drinking alcohol everyday for a long time. He went to [facility] last year but then checked himself out. He is still taking Naltrexone but drinks around a gallon of 80% proof vodka everyday. He says that he hasn't had episodes of vomiting after drinking and says that he has never aspirated.  He denied taking any over the counter supplements, said he took just one tylenol in the past couple of days and also denies taking any workout supplements.     Patient does not have any other significant medical history, with one admission to [address] [place] [address] for a laceration Tramaine Zelaya MD  [address]  [phone]    Schedule an appointment as soon as possible for a visit in 1 week      Jose De Jesus Anaya MD  [address] [place] [address]  [phone]    Call      Susan [address] [facility]  [phone]            Patient Instructions: None  Follow up labs: None  Follow up imaging: None    Note that over 30 minutes was spent in preparing discharge papers, discussing discharge with patient, medication review, etc.      Garth Davey MD, MD  Internal Medicine Resident, PGY-1  [facility];  [place], [place]  2/4/2020, 11:39 AM

## 2020-05-14 ENCOUNTER — HOSPITAL ENCOUNTER (EMERGENCY)
Age: 40
Discharge: HOME | End: 2020-05-14
Attending: EMERGENCY MEDICINE
Payer: MEDICAID

## 2020-05-14 VITALS
BODY MASS INDEX: 26.66 KG/M2 | OXYGEN SATURATION: 98 % | DIASTOLIC BLOOD PRESSURE: 74 MMHG | HEART RATE: 99 BPM | WEIGHT: 160 LBS | RESPIRATION RATE: 13 BRPM | TEMPERATURE: 98.5 F | SYSTOLIC BLOOD PRESSURE: 106 MMHG | HEIGHT: 65 IN

## 2020-05-14 LAB
EKG ATRIAL RATE: 98 BPM
EKG P AXIS: 57 DEGREES
EKG P-R INTERVAL: 148 MS
EKG Q-T INTERVAL: 360 MS
EKG QRS DURATION: 98 MS
EKG QTC CALCULATION (BAZETT): 459 MS
EKG R AXIS: 12 DEGREES
EKG T AXIS: 38 DEGREES
EKG VENTRICULAR RATE: 98 BPM
GLUCOSE BLD-MCNC: 139 MG/DL
GLUCOSE BLD-MCNC: 139 MG/DL (ref 75–110)

## 2020-05-14 PROCEDURE — 99284 EMERGENCY DEPT VISIT MOD MDM: CPT

## 2020-05-14 PROCEDURE — 93010 ELECTROCARDIOGRAM REPORT: CPT | Performed by: INTERNAL MEDICINE

## 2020-05-14 PROCEDURE — 82947 ASSAY GLUCOSE BLOOD QUANT: CPT

## 2020-05-14 PROCEDURE — 93005 ELECTROCARDIOGRAM TRACING: CPT | Performed by: STUDENT IN AN ORGANIZED HEALTH CARE EDUCATION/TRAINING PROGRAM

## 2020-05-14 ASSESSMENT — ENCOUNTER SYMPTOMS
SHORTNESS OF BREATH: 0
ABDOMINAL PAIN: 0
NAUSEA: 1
COUGH: 0
VOMITING: 0

## 2020-05-14 NOTE — ED TRIAGE NOTES
Pt comes to ED via EMS for an opiate OD. Pt was one of three in a house that overdosed resulting in one death, per pt they all snorted what they believed was cocaine. Pt given Narcan by EMS 4mg IV, 2mg IN and continues to be drowsy and disoriented. Patient has no signs or symptoms of acute distress at this time, remains on continuous telemetry and pulse ox monitoring.

## 2020-05-14 NOTE — ED PROVIDER NOTES
Comment: opiate OD 5/14/20    Sexual activity: Not on file   Lifestyle    Physical activity     Days per week: Not on file     Minutes per session: Not on file    Stress: Not on file   Relationships    Social connections     Talks on phone: Not on file     Gets together: Not on file     Attends Anabaptism service: Not on file     Active member of club or organization: Not on file     Attends meetings of clubs or organizations: Not on file     Relationship status: Not on file    Intimate partner violence     Fear of current or ex partner: Not on file     Emotionally abused: Not on file     Physically abused: Not on file     Forced sexual activity: Not on file   Other Topics Concern    Not on file   Social History Narrative    Not on file       No family history on file. Allergies:  Patient has no known allergies. Home Medications:  Prior to Admission medications    Medication Sig Start Date End Date Taking? Authorizing Provider   Naloxone HCl (NALOXONE OPIATE OVERDOSE KIT) 1 each by Nasal route once for 1 dose 5/14/20 5/14/20 Yes Gab Acosta,    folic acid (FOLVITE) 1 MG tablet Take 1 tablet by mouth daily 1/16/20   Erinn Hatch MD   nicotine (NICODERM CQ) 7 MG/24HR Place 1 patch onto the skin daily 1/16/20   Erinn Hatch MD   pantoprazole (PROTONIX) 40 MG tablet Take 1 tablet by mouth every morning (before breakfast) 1/16/20   Erinn Hatch MD   vitamin B-1 100 MG tablet Take 1 tablet by mouth daily 1/16/20   Erinn Hatch MD       REVIEW OFSYSTEMS    (2-9 systems for level 4, 10 or more for level 5)      Review of Systems   Constitutional: Negative for activity change, appetite change, fatigue and fever. Respiratory: Negative for cough and shortness of breath. Cardiovascular: Negative for chest pain. Gastrointestinal: Positive for nausea. Negative for abdominal pain and vomiting. Musculoskeletal: Negative for gait problem. Skin: Negative for wound.    Neurological: Negative for weakness, light-headedness, numbness and headaches. PHYSICAL EXAM   (up to 7 for level 4, 8 or more forlevel 5)      INITIAL VITALS:   ED Triage Vitals [05/14/20 0020]   BP Temp Temp Source Pulse Resp SpO2 Height Weight   127/82 98.5 °F (36.9 °C) Oral 112 20 95 % 5' 5\" (1.651 m) 160 lb (72.6 kg)     Vitals:    05/14/20 0031 05/14/20 0047 05/14/20 0213 05/14/20 0216   BP: 115/78 112/76     Pulse: 111 112 98    Resp: 13 14     Temp:       TempSrc:       SpO2: 95% 91%  97%   Weight:       Height:             Physical Exam  Vitals signs and nursing note reviewed. Constitutional:       General: He is not in acute distress. Appearance: Normal appearance. He is well-developed. He is not diaphoretic. HENT:      Head: Normocephalic and atraumatic. Eyes:      Extraocular Movements: Extraocular movements intact. Pupils: Pupils are equal, round, and reactive to light. Neck:      Musculoskeletal: Normal range of motion and neck supple. No muscular tenderness. Cardiovascular:      Rate and Rhythm: Regular rhythm. Tachycardia present. Heart sounds: Normal heart sounds. Pulmonary:      Effort: Pulmonary effort is normal. No respiratory distress. Breath sounds: Normal breath sounds. No wheezing or rales. Abdominal:      Palpations: Abdomen is soft. Tenderness: There is no abdominal tenderness. There is no guarding. Musculoskeletal: Normal range of motion. Skin:     General: Skin is warm and dry. Neurological:      Mental Status: He is alert. Mental status is at baseline. Comments: No gross motor or sensory deficits   Psychiatric:         Behavior: Behavior normal.         Thought Content:  Thought content normal.         DIFFERENTIAL  DIAGNOSIS     PLAN (LABS / IMAGING / EKG):  Orders Placed This Encounter   Procedures    POCT Glucose    EKG 12 Lead       MEDICATIONS ORDERED:  Orders Placed This Encounter   Medications    Naloxone HCl (NALOXONE OPIATE OVERDOSE KIT)

## 2020-05-14 NOTE — ED PROVIDER NOTES
[address] [facility]     Emergency Department     Faculty Attestation    I performed a history and physical examination of the patient and discussed management with the resident. I have reviewed and agree with the residents findings including all diagnostic interpretations, and treatment plans as written. Any areas of disagreement are noted on the chart. I was personally present for the key portions of any procedures. I have documented in the chart those procedures where I was not present during the key portions. I have reviewed the emergency nurses triage note. I agree with the chief complaint, past medical history, past surgical history, allergies, medications, social and family history as documented unless otherwise noted below. Documentation of the HPI, Physical Exam and Medical Decision Making performed by scribjudy is based on my personal performance of the HPI, PE and MDM. For Physician Assistant/ Nurse Practitioner cases/documentation I have personally evaluated this patient and have completed at least one if not all key elements of the E/M (history, physical exam, and MDM). Additional findings are as noted. 45 yo M overdose, reversed with narcan 6 mg, admits to cocaine, no suicidal or homicidal ideation, no injury, no fever no vomit, denies cp or sob or abdominal pain,   pe  Hr 110, flat affect, poor recall, ross, no cervical tenderness, crepitus, or deformity, chest non tender, abdomen non tender, no distension, no rigidity, extremities x4 atraumatic, nv intact,     Glucose stable, talkative, ambulatory, tolerating liquids,     EKG Interpretation    Interpreted by me  Normal sinus rhythm heart rate 98, normal axis, no ischemia, QT corrected 459    CRITICAL CARE: There was a high probability of clinically significant/life threatening deterioration in this patient's condition which required my urgent intervention. Total critical care time was 10 minutes. This excludes any time for separately reportable procedures.        [place],   05/14/20 [facility], DO  05/14/20 [facility],   05/14/20 0397

## 2020-05-14 NOTE — ED NOTES
[employer] present at bedside, pt too drowsy to talk to them. Patient has no signs or symptoms of acute distress at this time, remains on continuous telemetry and pulse ox monitoring.       Edu Cochran, RN  05/14/20 0624

## 2021-07-20 ENCOUNTER — APPOINTMENT (OUTPATIENT)
Dept: GENERAL RADIOLOGY | Age: 41
End: 2021-07-20
Payer: MEDICAID

## 2021-07-20 ENCOUNTER — HOSPITAL ENCOUNTER (EMERGENCY)
Age: 41
Discharge: HOME | End: 2021-07-20
Attending: EMERGENCY MEDICINE
Payer: MEDICAID

## 2021-07-20 VITALS
HEIGHT: 65 IN | BODY MASS INDEX: 26.66 KG/M2 | DIASTOLIC BLOOD PRESSURE: 85 MMHG | SYSTOLIC BLOOD PRESSURE: 125 MMHG | TEMPERATURE: 97.7 F | HEART RATE: 79 BPM | RESPIRATION RATE: 16 BRPM | WEIGHT: 160 LBS | OXYGEN SATURATION: 98 %

## 2021-07-20 DIAGNOSIS — R10.84 GENERALIZED ABDOMINAL PAIN: Primary | ICD-10-CM

## 2021-07-20 LAB
ABSOLUTE EOS #: 0.22 K/UL (ref 0–0.44)
ABSOLUTE IMMATURE GRANULOCYTE: 0.03 K/UL (ref 0–0.3)
ABSOLUTE LYMPH #: 2.12 K/UL (ref 1.1–3.7)
ABSOLUTE MONO #: 0.63 K/UL (ref 0.1–1.2)
ALBUMIN SERPL-MCNC: 4.5 G/DL (ref 3.5–5.2)
ALBUMIN/GLOBULIN RATIO: 1.6 (ref 1–2.5)
ALP BLD-CCNC: 76 U/L (ref 40–129)
ALT SERPL-CCNC: 62 U/L (ref 5–41)
ANION GAP SERPL CALCULATED.3IONS-SCNC: 10 MMOL/L (ref 9–17)
AST SERPL-CCNC: 41 U/L
BASOPHILS # BLD: 2 % (ref 0–2)
BASOPHILS ABSOLUTE: 0.11 K/UL (ref 0–0.2)
BILIRUB SERPL-MCNC: <0.1 MG/DL (ref 0.3–1.2)
BUN BLDV-MCNC: 17 MG/DL (ref 6–20)
BUN/CREAT BLD: ABNORMAL (ref 9–20)
CALCIUM SERPL-MCNC: 9.5 MG/DL (ref 8.6–10.4)
CHLORIDE BLD-SCNC: 105 MMOL/L (ref 98–107)
CO2: 23 MMOL/L (ref 20–31)
CREAT SERPL-MCNC: 0.69 MG/DL (ref 0.7–1.2)
DIFFERENTIAL TYPE: NORMAL
EOSINOPHILS RELATIVE PERCENT: 3 % (ref 1–4)
GFR AFRICAN AMERICAN: >60 ML/MIN
GFR NON-AFRICAN AMERICAN: >60 ML/MIN
GFR SERPL CREATININE-BSD FRML MDRD: ABNORMAL ML/MIN/{1.73_M2}
GFR SERPL CREATININE-BSD FRML MDRD: ABNORMAL ML/MIN/{1.73_M2}
GLUCOSE BLD-MCNC: 103 MG/DL (ref 70–99)
HCT VFR BLD CALC: 43.6 % (ref 40.7–50.3)
HEMOGLOBIN: 14.2 G/DL (ref 13–17)
IMMATURE GRANULOCYTES: 0 %
LIPASE: 36 U/L (ref 13–60)
LYMPHOCYTES # BLD: 30 % (ref 24–43)
MCH RBC QN AUTO: 31.3 PG (ref 25.2–33.5)
MCHC RBC AUTO-ENTMCNC: 32.6 G/DL (ref 28.4–34.8)
MCV RBC AUTO: 96.2 FL (ref 82.6–102.9)
MONOCYTES # BLD: 9 % (ref 3–12)
NRBC AUTOMATED: 0 PER 100 WBC
PDW BLD-RTO: 13.1 % (ref 11.8–14.4)
PLATELET # BLD: 365 K/UL (ref 138–453)
PLATELET ESTIMATE: NORMAL
PMV BLD AUTO: 8.7 FL (ref 8.1–13.5)
POTASSIUM SERPL-SCNC: 4.1 MMOL/L (ref 3.7–5.3)
RBC # BLD: 4.53 M/UL (ref 4.21–5.77)
RBC # BLD: NORMAL 10*6/UL
SEG NEUTROPHILS: 56 % (ref 36–65)
SEGMENTED NEUTROPHILS ABSOLUTE COUNT: 4.01 K/UL (ref 1.5–8.1)
SODIUM BLD-SCNC: 138 MMOL/L (ref 135–144)
TOTAL PROTEIN: 7.4 G/DL (ref 6.4–8.3)
WBC # BLD: 7.1 K/UL (ref 3.5–11.3)
WBC # BLD: NORMAL 10*3/UL

## 2021-07-20 PROCEDURE — 83690 ASSAY OF LIPASE: CPT

## 2021-07-20 PROCEDURE — 71045 X-RAY EXAM CHEST 1 VIEW: CPT

## 2021-07-20 PROCEDURE — 99284 EMERGENCY DEPT VISIT MOD MDM: CPT

## 2021-07-20 PROCEDURE — 2580000003 HC RX 258: Performed by: STUDENT IN AN ORGANIZED HEALTH CARE EDUCATION/TRAINING PROGRAM

## 2021-07-20 PROCEDURE — 96374 THER/PROPH/DIAG INJ IV PUSH: CPT

## 2021-07-20 PROCEDURE — 80053 COMPREHEN METABOLIC PANEL: CPT

## 2021-07-20 PROCEDURE — 6370000000 HC RX 637 (ALT 250 FOR IP): Performed by: STUDENT IN AN ORGANIZED HEALTH CARE EDUCATION/TRAINING PROGRAM

## 2021-07-20 PROCEDURE — 6360000002 HC RX W HCPCS: Performed by: STUDENT IN AN ORGANIZED HEALTH CARE EDUCATION/TRAINING PROGRAM

## 2021-07-20 PROCEDURE — 85025 COMPLETE CBC W/AUTO DIFF WBC: CPT

## 2021-07-20 RX ORDER — PANTOPRAZOLE SODIUM 40 MG/1
40 TABLET, DELAYED RELEASE ORAL ONCE
Status: COMPLETED | OUTPATIENT
Start: 2021-07-20 | End: 2021-07-20

## 2021-07-20 RX ORDER — KETOROLAC TROMETHAMINE 15 MG/ML
15 INJECTION, SOLUTION INTRAMUSCULAR; INTRAVENOUS ONCE
Status: COMPLETED | OUTPATIENT
Start: 2021-07-20 | End: 2021-07-20

## 2021-07-20 RX ORDER — 0.9 % SODIUM CHLORIDE 0.9 %
1000 INTRAVENOUS SOLUTION INTRAVENOUS ONCE
Status: COMPLETED | OUTPATIENT
Start: 2021-07-20 | End: 2021-07-20

## 2021-07-20 RX ORDER — ACETAMINOPHEN 325 MG/1
650 TABLET ORAL ONCE
Status: COMPLETED | OUTPATIENT
Start: 2021-07-20 | End: 2021-07-20

## 2021-07-20 RX ORDER — OMEPRAZOLE 40 MG/1
40 CAPSULE, DELAYED RELEASE ORAL
Qty: 30 CAPSULE | Refills: 0 | Status: SHIPPED | OUTPATIENT
Start: 2021-07-20

## 2021-07-20 RX ADMIN — SODIUM CHLORIDE 1000 ML: 9 INJECTION, SOLUTION INTRAVENOUS at 16:23

## 2021-07-20 RX ADMIN — KETOROLAC TROMETHAMINE 15 MG: 15 INJECTION, SOLUTION INTRAMUSCULAR; INTRAVENOUS at 16:24

## 2021-07-20 RX ADMIN — PANTOPRAZOLE SODIUM 40 MG: 40 TABLET, DELAYED RELEASE ORAL at 16:23

## 2021-07-20 RX ADMIN — ACETAMINOPHEN 650 MG: 325 TABLET ORAL at 16:23

## 2021-07-20 ASSESSMENT — PAIN SCALES - GENERAL
PAINLEVEL_OUTOF10: 6
PAINLEVEL_OUTOF10: 6

## 2021-07-20 NOTE — LETTER
[facility]  [address] [facility] [address]  Phone: [phone]               July 20, 2021    Patient: Abdoulaye Larkin   YOB: 1980   Date of Visit: 7/20/2021       To Whom It May Concern:    Nirali Albert was seen and treated in our emergency department on 7/20/2021.  He may return to work on 07/21/21       Sincerely,       Saulo Vaz RN         Signature:__________________________________

## 2021-07-20 NOTE — ED PROVIDER NOTES
[facility] ED  Emergency Department Encounter  EmergencyMedicine Resident     Pt Name:Rolly Barnett  MRN: [id number]  Shaquillegfmichael 1980  Date of evaluation: 7/20/21  PCP:  No primary care provider on file. This patient was evaluated in the Emergency Department for symptoms described in the history of present illness. The patient was evaluated in the context of the global COVID-19 pandemic, which necessitated consideration that the patient might be at risk for infection with the SARS-CoV-2 virus that causes COVID-19. Institutional protocols and algorithms that pertain to the evaluation of patients at risk for COVID-19 are in a state of rapid change based on information released by regulatory bodies including the CDC and federal and state organizations. These policies and algorithms were followed during the patient's care in the ED. CHIEF COMPLAINT       Chief Complaint   Patient presents with    Abdominal Pain       HISTORY OF PRESENT ILLNESS  (Location/Symptom, Timing/Onset, Context/Setting, Quality, Duration, Modifying Factors, Severity.)      Michael Mckeon is a 36 y.o. male with history of alcohol abuse, alcoholic hepatitis, presenting with abdominal pain. Patient states that he was binge drinking over the weekend with his friends, he began to have abdominal pain started about yesterday, he has been having intermittent pain about every hour without associated nausea or vomiting. Patient has not had a bowel movement since yesterday, he does not notice any blood in his bowel movements. Patiently patient has history of polysubstance abuse. He has a history of cocaine use, for which she had been admitted to the hospital without last year. Patient is currently afebrile is not complaining of chest pain shortness of breath or other concerning symptoms. PAST MEDICAL / SURGICAL / SOCIAL / FAMILY HISTORY      has no past medical history on file.        has no past surgical history on file.      Social History     Socioeconomic History    Marital status:      Spouse name: Not on file    Number of children: Not on file    Years of education: Not on file    Highest education level: Not on file   Occupational History    Not on file   Tobacco Use    Smoking status: Current Every Day Smoker   Substance and Sexual Activity    Alcohol use: Yes    Drug use: Yes     Types: Cocaine, Opiates      Comment: opiate OD 5/14/20    Sexual activity: Not on file   Other Topics Concern    Not on file   Social History Narrative    Not on file     Social Determinants of Health     Financial Resource Strain:     Difficulty of Paying Living Expenses:    Food Insecurity:     Worried About Running Out of Food in the Last Year:     [address] in the Last Year:    Transportation Needs:     Lack of Transportation (Medical):  Lack of Transportation (Non-Medical):    Physical Activity:     Days of Exercise per Week:     Minutes of Exercise per Session:    Stress:     Feeling of Stress :    Social Connections:     Frequency of Communication with Friends and Family:     Frequency of Social Gatherings with Friends and Family:     Attends Denominational Services:     Active Member of Clubs or Organizations:     Attends Club or Organization Meetings:     Marital Status:    Intimate Partner Violence:     Fear of Current or Ex-Partner:     Emotionally Abused:     Physically Abused:     Sexually Abused:        No family history on file. Allergies:  Patient has no known allergies. Home Medications:  Prior to Admission medications    Medication Sig Start Date End Date Taking?  Authorizing Provider   omeprazole (PRILOSEC) 40 MG delayed release capsule Take 1 capsule by mouth every morning (before breakfast) 7/20/21  Yes Olivia Becker,    folic acid (FOLVITE) 1 MG tablet Take 1 tablet by mouth daily 1/16/20   Montse Vasquez MD   nicotine (NICODERM CQ) 7 MG/24HR Place 1 patch onto the skin daily 1/16/20   Mayra Tineo MD   pantoprazole (PROTONIX) 40 MG tablet Take 1 tablet by mouth every morning (before breakfast) 1/16/20   Mayra Tineo MD   vitamin B-1 100 MG tablet Take 1 tablet by mouth daily 1/16/20   Mayra Tineo MD       REVIEW OF SYSTEMS    (2-9 systems for level 4, 10 or more for level 5)      Review of Systems   Constitutional: Negative for fever. HENT: Negative for trouble swallowing. Eyes: Negative for visual disturbance. Cardiovascular: Negative for palpitations. Gastrointestinal: Positive for abdominal pain. Negative for abdominal distention, blood in stool, constipation, diarrhea, nausea and vomiting. Endocrine: Negative for polyuria. Genitourinary: Negative for difficulty urinating. Musculoskeletal: Negative for back pain. Skin: Negative for color change. Neurological: Negative for syncope and weakness. PHYSICAL EXAM   (up to 7 for level 4, 8 or more for level 5)      INITIAL VITALS:   /85   Pulse 79   Temp 97.7 °F (36.5 °C) (Oral)   Resp 16   Ht 5' 5\" (1.651 m)   Wt 160 lb (72.6 kg)   SpO2 98%   BMI 26.63 kg/m²     Physical Exam  Exam conducted with a chaperone present. Constitutional:       Appearance: He is well-developed. HENT:      Head: Normocephalic and atraumatic. Mouth/Throat:      Mouth: Mucous membranes are moist.   Cardiovascular:      Rate and Rhythm: Normal rate and regular rhythm. Heart sounds: Normal heart sounds. Pulmonary:      Effort: Pulmonary effort is normal.      Breath sounds: Normal breath sounds. Abdominal:      General: Abdomen is flat. There is no distension or abdominal bruit. There are no signs of injury. Palpations: Abdomen is soft. Tenderness: There is generalized abdominal tenderness. There is no guarding or rebound. Hernia: No hernia is present.           Comments: Generalized tenderness present, patient does not localize to any specific quadrant more than the other, he does not have any nausea or vomiting, no history of gallstones,  exams unremarkable, no testicular swelling. Genitourinary:     Penis: Normal. No tenderness or swelling. Testes:         Right: Mass not present. Left: Mass not present. Epididymis:      Right: Normal.      Left: Normal.   Skin:     General: Skin is warm and dry. Neurological:      Mental Status: He is alert. DIFFERENTIAL  DIAGNOSIS     PLAN (LABS / IMAGING / EKG):  Orders Placed This Encounter   Procedures    XR CHEST PORTABLE    CBC Auto Differential    Comprehensive Metabolic Panel w/ Reflex to MG    Lipase       MEDICATIONS ORDERED:  Orders Placed This Encounter   Medications    0.9 % sodium chloride bolus    acetaminophen (TYLENOL) tablet 650 mg    pantoprazole (PROTONIX) tablet 40 mg    ketorolac (TORADOL) injection 15 mg    omeprazole (PRILOSEC) 40 MG delayed release capsule     Sig: Take 1 capsule by mouth every morning (before breakfast)     Dispense:  30 capsule     Refill:  0       DDX: Gastritis, cryptitis, hepatitis, unlikely MI, lower lobe pneumonia.     DIAGNOSTIC RESULTS / EMERGENCY DEPARTMENT COURSE / MDM   LAB RESULTS:  Results for orders placed or performed during the hospital encounter of 07/20/21   CBC Auto Differential   Result Value Ref Range    WBC 7.1 3.5 - 11.3 k/uL    RBC 4.53 4.21 - 5.77 m/uL    Hemoglobin 14.2 13.0 - 17.0 g/dL    Hematocrit 43.6 40.7 - 50.3 %    MCV 96.2 82.6 - 102.9 fL    MCH 31.3 25.2 - 33.5 pg    MCHC 32.6 28.4 - 34.8 g/dL    RDW 13.1 11.8 - 14.4 %    Platelets 358 373 - 120 k/uL    MPV 8.7 8.1 - 13.5 fL    NRBC Automated 0.0 0.0 per 100 WBC    Differential Type NOT REPORTED     Seg Neutrophils 56 36 - 65 %    Lymphocytes 30 24 - 43 %    Monocytes 9 3 - 12 %    Eosinophils % 3 1 - 4 %    Basophils 2 0 - 2 %    Immature Granulocytes 0 0 %    Segs Absolute 4.01 1.50 - 8.10 k/uL    Absolute Lymph # 2.12 1.10 - 3.70 k/uL    Absolute Mono # 0.63 0.10 - 1.20 k/uL Absolute Eos # 0.22 0.00 - 0.44 k/uL    Basophils Absolute 0.11 0.00 - 0.20 k/uL    Absolute Immature Granulocyte 0.03 0.00 - 0.30 k/uL    WBC Morphology NOT REPORTED     RBC Morphology NOT REPORTED     Platelet Estimate NOT REPORTED    Comprehensive Metabolic Panel w/ Reflex to MG   Result Value Ref Range    Glucose 103 (H) 70 - 99 mg/dL    BUN 17 6 - 20 mg/dL    CREATININE 0.69 (L) 0.70 - 1.20 mg/dL    Bun/Cre Ratio NOT REPORTED 9 - 20    Calcium 9.5 8.6 - 10.4 mg/dL    Sodium 138 135 - 144 mmol/L    Potassium 4.1 3.7 - 5.3 mmol/L    Chloride 105 98 - 107 mmol/L    CO2 23 20 - 31 mmol/L    Anion Gap 10 9 - 17 mmol/L    Alkaline Phosphatase 76 40 - 129 U/L    ALT 62 (H) 5 - 41 U/L    AST 41 (H) <40 U/L    Total Bilirubin <0.10 (L) 0.3 - 1.2 mg/dL    Total Protein 7.4 6.4 - 8.3 g/dL    Albumin 4.5 3.5 - 5.2 g/dL    Albumin/Globulin Ratio 1.6 1.0 - 2.5    GFR Non-African American >60 >60 mL/min    GFR African American >60 >60 mL/min    GFR Comment          GFR Staging NOT REPORTED    Lipase   Result Value Ref Range    Lipase 36 13 - 60 U/L       IMPRESSION: Patient is a 80-year-old male, presented with abdominal pain. Patient has had a history of alcoholic hepatitis, alcohol abuse, polysubstance abuse. Review of labs remarkable for markedly elevated liver enzymes last year in January following similar episode of binge drinking. At that time patient stated that he drank a gallon of 80 proof vodka daily, however as he has since decreased the amount of alcohol he takes in. Patient does not appear toxic today, is otherwise doing well. Physical exam was unremarkable for right upper quadrant tenderness or rigid abdomen concerning for peritoneal signs. RADIOLOGY:  XR CHEST PORTABLE    Result Date: 7/20/2021  No acute cardiopulmonary process. No evidence of intra-abdominal free air. FINAL IMPRESSION      1.  Generalized abdominal pain          DISPOSITION / PLAN     DISPOSITION Decision To Discharge 07/20/2021 05:46:31 PM      PATIENT REFERRED TO:  [facility]  [address] [facility] [address]  [phone]  Schedule an appointment as soon as possible for a visit in 1 week  As needed, If symptoms worsen      DISCHARGE MEDICATIONS:  Discharge Medication List as of 7/20/2021  6:03 PM      START taking these medications    Details   omeprazole (PRILOSEC) 40 MG delayed release capsule Take 1 capsule by mouth every morning (before breakfast), Disp-30 capsule, R-0Print             Adore Donnelly DO  Emergency Medicine Resident    (Please note that portions of thisnote were completed with a voice recognition program.  Efforts were made to edit the dictations but occasionally words are mis-transcribed.)        Kevin Canchola DO  Resident  07/21/21 5956

## 2021-07-20 NOTE — ED PROVIDER NOTES
[facility] [address] [facility]     Emergency Department     Faculty Attestation    I performed a history and physical examination of the patient and discussed management with the resident. I reviewed the residents note and agree with the documented findings and plan of care. Any areas of disagreement are noted on the chart. I was personally present for the key portions of any procedures. I have documented in the chart those procedures where I was not present during the key portions. I have reviewed the emergency nurses triage note. I agree with the chief complaint, past medical history, past surgical history, allergies, medications, social and family history as documented unless otherwise noted below. For Physician Assistant/ Nurse Practitioner cases/documentation I have personally evaluated this patient and have completed at least one if not all key elements of the E/M (history, physical exam, and MDM). Additional findings are as noted. This patient was evaluated in the Emergency Department for symptoms described in the history of present illness. He/she was evaluated in the context of the global COVID-19 pandemic, which necessitated consideration that the patient might be at risk for infection with the SARS-CoV-2 virus that causes COVID-19. Institutional protocols and algorithms that pertain to the evaluation of patients at risk for COVID-19 are in a state of rapid change based on information released by regulatory bodies including the CDC and federal and state organizations. These policies and algorithms were followed during the patient's care in the ED. Patient here with abdominal pain nausea. States that over the weekend he had a significant bout of alcohol drinking all weekend with friends celebrating. States he is only had pain like this in the past when he is drink alcohol. However denies a history of gastritis pancreatitis or peptic ulcer disease. Nausea but no vomiting.   Pain does not

## 2021-07-20 NOTE — ED NOTES
Pt. To the ED c/o lower abd pain that started on Monday and has gotten worse. Pt. States that the last time he had this pain it was from drinking too much, Pt. States that he was drinking \"all last weekend and a little last night\". Pt. Denies N/V/D but does say he has loss of appetite. Pt. Is A&Ox4, RR even and non-labored. NAD noted.       Royce Martinez RN  07/20/21 3744

## 2021-07-21 ASSESSMENT — ENCOUNTER SYMPTOMS
BACK PAIN: 0
VOMITING: 0
DIARRHEA: 0
COLOR CHANGE: 0
TROUBLE SWALLOWING: 0
BLOOD IN STOOL: 0
ABDOMINAL DISTENTION: 0
ABDOMINAL PAIN: 1
NAUSEA: 0
CONSTIPATION: 0

## 2021-12-14 ENCOUNTER — HOSPITAL ENCOUNTER (EMERGENCY)
Age: 41
Discharge: HOME | End: 2021-12-14
Attending: EMERGENCY MEDICINE
Payer: MEDICAID

## 2021-12-14 ENCOUNTER — APPOINTMENT (OUTPATIENT)
Dept: GENERAL RADIOLOGY | Age: 41
End: 2021-12-14
Payer: MEDICAID

## 2021-12-14 VITALS
SYSTOLIC BLOOD PRESSURE: 115 MMHG | OXYGEN SATURATION: 98 % | RESPIRATION RATE: 14 BRPM | HEART RATE: 80 BPM | TEMPERATURE: 98 F | DIASTOLIC BLOOD PRESSURE: 82 MMHG

## 2021-12-14 DIAGNOSIS — R10.13 ABDOMINAL PAIN, EPIGASTRIC: Primary | ICD-10-CM

## 2021-12-14 LAB
ABSOLUTE EOS #: 0.12 K/UL (ref 0–0.44)
ABSOLUTE IMMATURE GRANULOCYTE: <0.03 K/UL (ref 0–0.3)
ABSOLUTE LYMPH #: 1.83 K/UL (ref 1.1–3.7)
ABSOLUTE MONO #: 0.64 K/UL (ref 0.1–1.2)
ALBUMIN SERPL-MCNC: 4.9 G/DL (ref 3.5–5.2)
ALBUMIN/GLOBULIN RATIO: 1.6 (ref 1–2.5)
ALP BLD-CCNC: 85 U/L (ref 40–129)
ALT SERPL-CCNC: 52 U/L (ref 5–41)
ANION GAP SERPL CALCULATED.3IONS-SCNC: 18 MMOL/L (ref 9–17)
AST SERPL-CCNC: 63 U/L
BASOPHILS # BLD: 1 % (ref 0–2)
BASOPHILS ABSOLUTE: 0.07 K/UL (ref 0–0.2)
BILIRUB SERPL-MCNC: 0.17 MG/DL (ref 0.3–1.2)
BILIRUBIN DIRECT: 0.1 MG/DL
BILIRUBIN, INDIRECT: 0.07 MG/DL (ref 0–1)
BUN BLDV-MCNC: 9 MG/DL (ref 6–20)
BUN/CREAT BLD: ABNORMAL (ref 9–20)
CALCIUM SERPL-MCNC: 9.4 MG/DL (ref 8.6–10.4)
CHLORIDE BLD-SCNC: 98 MMOL/L (ref 98–107)
CO2: 23 MMOL/L (ref 20–31)
CREAT SERPL-MCNC: 0.92 MG/DL (ref 0.7–1.2)
DIFFERENTIAL TYPE: NORMAL
EOSINOPHILS RELATIVE PERCENT: 2 % (ref 1–4)
GFR AFRICAN AMERICAN: >60 ML/MIN
GFR NON-AFRICAN AMERICAN: >60 ML/MIN
GFR SERPL CREATININE-BSD FRML MDRD: ABNORMAL ML/MIN/{1.73_M2}
GFR SERPL CREATININE-BSD FRML MDRD: ABNORMAL ML/MIN/{1.73_M2}
GLOBULIN: ABNORMAL G/DL (ref 1.5–3.8)
GLUCOSE BLD-MCNC: 100 MG/DL (ref 70–99)
HCT VFR BLD CALC: 47.2 % (ref 40.7–50.3)
HEMOGLOBIN: 15.5 G/DL (ref 13–17)
IMMATURE GRANULOCYTES: 0 %
LIPASE: 26 U/L (ref 13–60)
LYMPHOCYTES # BLD: 28 % (ref 24–43)
MCH RBC QN AUTO: 31.1 PG (ref 25.2–33.5)
MCHC RBC AUTO-ENTMCNC: 32.8 G/DL (ref 28.4–34.8)
MCV RBC AUTO: 94.8 FL (ref 82.6–102.9)
MONOCYTES # BLD: 10 % (ref 3–12)
NRBC AUTOMATED: 0 PER 100 WBC
PDW BLD-RTO: 12.3 % (ref 11.8–14.4)
PLATELET # BLD: 344 K/UL (ref 138–453)
PLATELET ESTIMATE: NORMAL
PMV BLD AUTO: 8.6 FL (ref 8.1–13.5)
POTASSIUM SERPL-SCNC: 4 MMOL/L (ref 3.7–5.3)
RBC # BLD: 4.98 M/UL (ref 4.21–5.77)
RBC # BLD: NORMAL 10*6/UL
SEG NEUTROPHILS: 59 % (ref 36–65)
SEGMENTED NEUTROPHILS ABSOLUTE COUNT: 3.79 K/UL (ref 1.5–8.1)
SODIUM BLD-SCNC: 139 MMOL/L (ref 135–144)
TOTAL PROTEIN: 8 G/DL (ref 6.4–8.3)
TROPONIN INTERP: NORMAL
TROPONIN T: NORMAL NG/ML
TROPONIN, HIGH SENSITIVITY: <6 NG/L (ref 0–22)
WBC # BLD: 6.5 K/UL (ref 3.5–11.3)
WBC # BLD: NORMAL 10*3/UL

## 2021-12-14 PROCEDURE — 96374 THER/PROPH/DIAG INJ IV PUSH: CPT

## 2021-12-14 PROCEDURE — 80076 HEPATIC FUNCTION PANEL: CPT

## 2021-12-14 PROCEDURE — 83690 ASSAY OF LIPASE: CPT

## 2021-12-14 PROCEDURE — 6370000000 HC RX 637 (ALT 250 FOR IP): Performed by: STUDENT IN AN ORGANIZED HEALTH CARE EDUCATION/TRAINING PROGRAM

## 2021-12-14 PROCEDURE — 85025 COMPLETE CBC W/AUTO DIFF WBC: CPT

## 2021-12-14 PROCEDURE — 71045 X-RAY EXAM CHEST 1 VIEW: CPT

## 2021-12-14 PROCEDURE — 84484 ASSAY OF TROPONIN QUANT: CPT

## 2021-12-14 PROCEDURE — 2580000003 HC RX 258: Performed by: STUDENT IN AN ORGANIZED HEALTH CARE EDUCATION/TRAINING PROGRAM

## 2021-12-14 PROCEDURE — 93005 ELECTROCARDIOGRAM TRACING: CPT | Performed by: STUDENT IN AN ORGANIZED HEALTH CARE EDUCATION/TRAINING PROGRAM

## 2021-12-14 PROCEDURE — 99284 EMERGENCY DEPT VISIT MOD MDM: CPT

## 2021-12-14 PROCEDURE — 2500000003 HC RX 250 WO HCPCS: Performed by: STUDENT IN AN ORGANIZED HEALTH CARE EDUCATION/TRAINING PROGRAM

## 2021-12-14 PROCEDURE — 80048 BASIC METABOLIC PNL TOTAL CA: CPT

## 2021-12-14 RX ORDER — 0.9 % SODIUM CHLORIDE 0.9 %
500 INTRAVENOUS SOLUTION INTRAVENOUS ONCE
Status: COMPLETED | OUTPATIENT
Start: 2021-12-14 | End: 2021-12-14

## 2021-12-14 RX ORDER — LIDOCAINE HYDROCHLORIDE 20 MG/ML
15 SOLUTION OROPHARYNGEAL ONCE
Status: COMPLETED | OUTPATIENT
Start: 2021-12-14 | End: 2021-12-14

## 2021-12-14 RX ORDER — MAGNESIUM HYDROXIDE/ALUMINUM HYDROXICE/SIMETHICONE 120; 1200; 1200 MG/30ML; MG/30ML; MG/30ML
30 SUSPENSION ORAL ONCE
Status: COMPLETED | OUTPATIENT
Start: 2021-12-14 | End: 2021-12-14

## 2021-12-14 RX ADMIN — SODIUM CHLORIDE 500 ML: 0.9 INJECTION, SOLUTION INTRAVENOUS at 14:46

## 2021-12-14 RX ADMIN — LIDOCAINE HYDROCHLORIDE 15 ML: 20 SOLUTION ORAL; TOPICAL at 15:32

## 2021-12-14 RX ADMIN — FAMOTIDINE 20 MG: 10 INJECTION, SOLUTION INTRAVENOUS at 14:47

## 2021-12-14 RX ADMIN — ALUMINUM HYDROXIDE, MAGNESIUM HYDROXIDE, AND SIMETHICONE 30 ML: 200; 200; 20 SUSPENSION ORAL at 15:32

## 2021-12-14 ASSESSMENT — ENCOUNTER SYMPTOMS
ABDOMINAL PAIN: 1
COUGH: 0
VOMITING: 0
SHORTNESS OF BREATH: 0
NAUSEA: 0
PHOTOPHOBIA: 0

## 2021-12-14 ASSESSMENT — PAIN SCALES - GENERAL: PAINLEVEL_OUTOF10: 8

## 2021-12-14 ASSESSMENT — PAIN DESCRIPTION - LOCATION: LOCATION: ABDOMEN

## 2021-12-14 ASSESSMENT — PAIN DESCRIPTION - PAIN TYPE: TYPE: ACUTE PAIN

## 2021-12-14 NOTE — ED PROVIDER NOTES
[facility]     Emergency Department     Faculty Attestation    I performed a history and physical examination of the patient and discussed management with the resident. I reviewed the resident´s note and agree with the documented findings and plan of care. Any areas of disagreement are noted on the chart. I was personally present for the key portions of any procedures. I have documented in the chart those procedures where I was not present during the key portions. I have reviewed the emergency nurses triage note. I agree with the chief complaint, past medical history, past surgical history, allergies, medications, social and family history as documented unless otherwise noted below. For Physician Assistant/ Nurse Practitioner cases/documentation I have personally evaluated this patient and have completed at least one if not all key elements of the E/M (history, physical exam, and MDM). Additional findings are as noted. Chest clear,  Heart exam normal , no pain or swelling on examination of the lower extremities , equal pulses both wrists , trachea midline. Abdomen is tender in the epigastrium without pulsatile mass or bruit. Patient denies chest pain, pain in his abdomen does not radiate to his back. Patient appears comfortable in no distress, skin is warm and dry. Patient thinks he may have had some blood in his stools. Denies vomiting.     Elaine Renee MD [address] [facility]  Attending Physician         EKG Interpretation    Interpreted by emergency department physician    Rhythm: normal sinus   Rate: normal/89  Axis: normal 10  Ectopy: none  Conduction: normal  ST Segments: no acute change  T Waves: no acute change  Q Waves: none    Clinical Impression: Normal EKG    Kathy Saxena MD  12/14/21 4627

## 2021-12-14 NOTE — ED NOTES
Bed: 23  Expected date:   Expected time:   Means of arrival:   Comments:     Anna Cota RN  12/14/21 7175

## 2021-12-14 NOTE — ED NOTES
Writer at bedside as chaperone for Dr Mehrdad Camacho for rectal exam. Hemoccult blood negative      Cleveland Snow, RN  12/14/21 7937

## 2021-12-14 NOTE — Clinical Note
Esther Jacobs was seen and treated in our emergency department on 12/14/2021. He may return to work on 12/16/2021. If you have any questions or concerns, please don't hesitate to call.       Lazaro Angel MD

## 2021-12-14 NOTE — ED NOTES
Report rec'd from [facility]. Pt resting on cot, NAD noted.       Gloria Li, RN  12/14/21 8566

## 2021-12-14 NOTE — Clinical Note
Brionna Fitzpatrick was seen and treated in our emergency department on 12/14/2021. He may return to work on 12/16/2021. If you have any questions or concerns, please don't hesitate to call.       Irineo Gee MD

## 2021-12-14 NOTE — ED PROVIDER NOTES
[address]  ED  Emergency Department Encounter  Emergency Medicine Resident     Pt Name: Juan Esquivel  MRN: [id number]  [place] 1980  Date of evaluation: 12/14/21  PCP:  No primary care provider on file. CHIEF COMPLAINT       Chief Complaint   Patient presents with    Abdominal Pain     reports epigastric pain s/p etoh last night and cocaine. states hx of pancreatitis       HISTORY OFPRESENT ILLNESS  (Location/Symptom, Timing/Onset, Context/Setting, Quality, Duration, Modifying Factors,Severity.)      Juan Esquivel is a 36 y. o.yo male who presents with epigastric abdominal pain that has started yesterday after he drank a lot of alcohol. Patient reports that he usually does not drink that much. Patient reports that in addition to drinking a lot he also did a lot of cocaine. He did report that he sometimes do cocaine however he does not mix it with alcohol. Patient is complaining of chest pain, and intense epigastric pain. He reports he has not vomited nor felt nauseous. He reports that he does feel like himself. He denies any headache, blurry vision, fatigue, malaise, urinary dysuria frequency. Patient reports he has not noticed if his urine has changed in color. PAST MEDICAL / SURGICAL / SOCIAL / FAMILY HISTORY      has no past medical history on file. has no past surgical history on file. Social History     Socioeconomic History    Marital status:      Spouse name: Not on file    Number of children: Not on file    Years of education: Not on file    Highest education level: Not on file   Occupational History    Not on file   Tobacco Use    Smoking status: Current Every Day Smoker    Smokeless tobacco: Not on file   Substance and Sexual Activity    Alcohol use:  Yes    Drug use: Yes     Types: Cocaine, Opiates      Comment: opiate OD 5/14/20    Sexual activity: Not on file   Other Topics Concern    Not on file   Social History Narrative    Not on file     Social Determinants of Health     Financial Resource Strain:     Difficulty of Paying Living Expenses: Not on file   Food Insecurity:     Worried About Running Out of Food in the Last Year: Not on file    Adi of Food in the Last Year: Not on file   Transportation Needs:     Lack of Transportation (Medical): Not on file    Lack of Transportation (Non-Medical): Not on file   Physical Activity:     Days of Exercise per Week: Not on file    Minutes of Exercise per Session: Not on file   Stress:     Feeling of Stress : Not on file   Social Connections:     Frequency of Communication with Friends and Family: Not on file    Frequency of Social Gatherings with Friends and Family: Not on file    Attends Denominational Services: Not on file    Active Member of [address] [place] [address] [employer] or Organizations: Not on file    Attends Club or Organization Meetings: Not on file    Marital Status: Not on file   Intimate Partner Violence:     Fear of Current or Ex-Partner: Not on file    Emotionally Abused: Not on file    Physically Abused: Not on file    Sexually Abused: Not on file   Housing Stability:     Unable to Pay for Housing in the Last Year: Not on file    Number of [place] in the Last Year: Not on file    Unstable Housing in the Last Year: Not on file       History reviewed. No pertinent family history. Allergies:  Patient has no known allergies. Home Medications:  Prior to Admission medications    Medication Sig Start Date End Date Taking?  Authorizing Provider   omeprazole (PRILOSEC) 40 MG delayed release capsule Take 1 capsule by mouth every morning (before breakfast) 7/20/21   Sonia Perez DO   folic acid (FOLVITE) 1 MG tablet Take 1 tablet by mouth daily 1/16/20   Shaun Vigil MD   nicotine (NICODERM CQ) 7 MG/24HR Place 1 patch onto the skin daily 1/16/20   Shaun Vigil MD   pantoprazole (PROTONIX) 40 MG tablet Take 1 tablet by mouth every morning (before breakfast) 1/16/20   Magali Castleman MD Margot   vitamin B-1 100 MG tablet Take 1 tablet by mouth daily 1/16/20   Oc Cormier MD       REVIEW OFSYSTEMS    (2-9 systems for level 4, 10 or more for level 5)      Review of Systems   Constitutional: Negative for fatigue and fever. Eyes: Negative for photophobia and visual disturbance. Respiratory: Negative for cough and shortness of breath. Cardiovascular: Positive for chest pain. Gastrointestinal: Positive for abdominal pain. Negative for nausea and vomiting. Genitourinary: Negative for dysuria and urgency. Skin: Negative for rash and wound. Psychiatric/Behavioral: Negative for behavioral problems and confusion. PHYSICAL EXAM   (up to 7 for level 4, 8 or more forlevel 5)      INITIAL VITALS:   ED Triage Vitals   BP Temp Temp Source Pulse Resp SpO2 Height Weight   12/14/21 1441 12/14/21 1448 12/14/21 1448 12/14/21 1448 12/14/21 1448 12/14/21 1441 -- --   (!) 134/95 98 °F (36.7 °C) Oral 80 14 96 %         Physical Exam  HENT:      Head: Normocephalic and atraumatic. Mouth/Throat:      Mouth: Mucous membranes are moist.   Eyes:      Extraocular Movements: Extraocular movements intact. Pupils: Pupils are equal, round, and reactive to light. Cardiovascular:      Rate and Rhythm: Normal rate and regular rhythm. Pulmonary:      Effort: Pulmonary effort is normal. No respiratory distress. Abdominal:      General: Abdomen is flat. Tenderness: There is abdominal tenderness in the epigastric area. Musculoskeletal:         General: No swelling or tenderness. Cervical back: No rigidity. Skin:     General: Skin is warm. Capillary Refill: Capillary refill takes less than 2 seconds. Coloration: Skin is not jaundiced. Neurological:      General: No focal deficit present. Mental Status: He is alert and oriented to person, place, and time.    Psychiatric:         Mood and Affect: Mood normal.         Behavior: Behavior normal.         DIFFERENTIAL Emergency Department Physicianwho either signs or Co-signs this chart in the absence of a cardiologist.    EMERGENCY DEPARTMENT COURSE:  ED Course as of 12/14/21 1615   Tue Dec 14, 2021   1536 Stool guaiac was negative for blood [AN]   1612 Reassessed, he reports that he is feeling better. Patient labs unremarkable, troponin normal, BMP normal, he did have mild elevation in his ALT and AST of 52 and 63 respectively however is significantly decreased from a previous values of 1000 and 500s 1 year ago. Patient discharged. He is requesting for a work note. [AN]      ED Course User Index  [AN] Mason Deleon MD          PROCEDURES:  None    CONSULTS:  None    CRITICAL CARE:      FINAL IMPRESSION      1.  Abdominal pain, epigastric          DISPOSITION / PLAN     DISPOSITION        PATIENT REFERRED TO:  [facility]  [address] [address]  [phone]    If symptoms worsen      DISCHARGE MEDICATIONS:  New Prescriptions    No medications on file       Mason Deleon MD  Emergency Medicine Resident    (Please note that portions of this note were completed with a voice recognition program.Efforts were made to edit the dictations but occasionally words are mis-transcribed.)        Mason Deleon MD  Resident  12/14/21 5714

## 2021-12-15 LAB
EKG ATRIAL RATE: 89 BPM
EKG P AXIS: 52 DEGREES
EKG P-R INTERVAL: 148 MS
EKG Q-T INTERVAL: 370 MS
EKG QRS DURATION: 88 MS
EKG QTC CALCULATION (BAZETT): 450 MS
EKG R AXIS: 10 DEGREES
EKG T AXIS: 34 DEGREES
EKG VENTRICULAR RATE: 89 BPM

## 2021-12-15 PROCEDURE — 93010 ELECTROCARDIOGRAM REPORT: CPT | Performed by: INTERNAL MEDICINE
